# Patient Record
Sex: FEMALE | Race: WHITE | NOT HISPANIC OR LATINO | Employment: OTHER | URBAN - METROPOLITAN AREA
[De-identification: names, ages, dates, MRNs, and addresses within clinical notes are randomized per-mention and may not be internally consistent; named-entity substitution may affect disease eponyms.]

---

## 2023-05-19 ENCOUNTER — ANCILLARY PROCEDURE (OUTPATIENT)
Dept: GENERAL RADIOLOGY | Facility: CLINIC | Age: 73
End: 2023-05-19
Attending: FAMILY MEDICINE
Payer: COMMERCIAL

## 2023-05-19 ENCOUNTER — OFFICE VISIT (OUTPATIENT)
Dept: ORTHOPEDICS | Facility: CLINIC | Age: 73
End: 2023-05-19
Payer: COMMERCIAL

## 2023-05-19 VITALS — HEIGHT: 65 IN | BODY MASS INDEX: 27.82 KG/M2 | WEIGHT: 167 LBS

## 2023-05-19 DIAGNOSIS — M76.02 GLUTEAL TENDINITIS OF LEFT BUTTOCK: ICD-10-CM

## 2023-05-19 DIAGNOSIS — M25.561 CHRONIC PAIN OF RIGHT KNEE: ICD-10-CM

## 2023-05-19 DIAGNOSIS — G89.29 CHRONIC PAIN OF RIGHT KNEE: Primary | ICD-10-CM

## 2023-05-19 DIAGNOSIS — M25.561 CHRONIC PAIN OF RIGHT KNEE: Primary | ICD-10-CM

## 2023-05-19 DIAGNOSIS — M17.11 PRIMARY OSTEOARTHRITIS OF RIGHT KNEE: ICD-10-CM

## 2023-05-19 DIAGNOSIS — G89.29 CHRONIC PAIN OF RIGHT KNEE: ICD-10-CM

## 2023-05-19 PROCEDURE — 99204 OFFICE O/P NEW MOD 45 MIN: CPT | Performed by: FAMILY MEDICINE

## 2023-05-19 PROCEDURE — 73562 X-RAY EXAM OF KNEE 3: CPT | Mod: TC | Performed by: FAMILY MEDICINE

## 2023-05-19 NOTE — PATIENT INSTRUCTIONS
1. Chronic pain of right knee    2. Primary osteoarthritis of right knee    3. Gluteal tendinitis of left buttock      -Patient has chronic knee pain, instability and decreased endurance due to severe arthritis  -Patient will start formal physical therapy and home exercise program to strengthen and stabilize her knee.  -Patient also has progressive left hip pain due to muscle and tendon strains from compensation of the right leg  -Patient may continue with over-the-counter pain medications as needed  -Patient will follow up if pain does not improve.  To consider a cortisone injection of the right knee.  Potential cortisone of the left hip as well if indicated.  -If patient has persistent left hip pain, she will follow-up in the clinic for x-rays and further evaluation.  -Call direct clinic number [[phone]] at any time with questions or concerns.    Albert Yeo MD [facility]  [facility] [place]
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
## 2023-05-21 ENCOUNTER — HEALTH MAINTENANCE LETTER (OUTPATIENT)
Age: 73
End: 2023-05-21

## 2023-06-01 ASSESSMENT — ACTIVITIES OF DAILY LIVING (ADL)
KNEEL ON THE FRONT OF YOUR KNEE: ACTIVITY IS VERY DIFFICULT
RECREATIONAL_ACTIVITIES: SLIGHT DIFFICULTY
WALKING_UP_STEEP_HILLS: NO DIFFICULTY AT ALL
GETTING INTO AND OUT OF AN AVERAGE CAR: SLIGHT DIFFICULTY
SWELLING: THE SYMPTOM AFFECTS MY ACTIVITY SLIGHTLY
STEPPING UP AND DOWN CURBS: NO DIFFICULTY AT ALL
ROLLING_OVER_IN_BED: NO DIFFICULTY AT ALL
HOW_WOULD_YOU_RATE_YOUR_CURRENT_LEVEL_OF_FUNCTION_DURING_YOUR_USUAL_ACTIVITIES_OF_DAILY_LIVING_FROM_0_TO_100_WITH_100_BEING_YOUR_LEVEL_OF_FUNCTION_PRIOR_TO_YOUR_HIP_PROBLEM_AND_0_BEING_THE_INABILITY_TO_PERFORM_ANY_OF_YOUR_USUAL_DAILY_ACTIVITIES?: 80
HOW_WOULD_YOU_RATE_YOUR_CURRENT_LEVEL_OF_FUNCTION?: ABNORMAL
GO DOWN STAIRS: ACTIVITY IS MINIMALLY DIFFICULT
WALK: ACTIVITY IS SOMEWHAT DIFFICULT
WALKING_INITIALLY: MODERATE DIFFICULTY
GOING_UP_1_FLIGHT_OF_STAIRS: NO DIFFICULTY AT ALL
WALKING_INITIALLY: MODERATE DIFFICULTY
RECREATIONAL ACTIVITIES: SLIGHT DIFFICULTY
LANDING: SLIGHT DIFFICULTY
DEEP SQUATTING: EXTREME DIFFICULTY
SPORTS_COUNT: 9
DEEP_SQUATTING: EXTREME DIFFICULTY
HOS_ADL_SCORE(%): 82.35
GO UP STAIRS: ACTIVITY IS MINIMALLY DIFFICULT
PUTTING ON SOCKS AND SHOES: NO DIFFICULTY AT ALL
LOW_IMPACT_ACTIVITIES_LIKE_FAST_WALKING: MODERATE DIFFICULTY
GETTING_INTO_AND_OUT_OF_A_BATHTUB: MODERATE DIFFICULTY
STIFFNESS: THE SYMPTOM AFFECTS MY ACTIVITY MODERATELY
PUTTING_ON_SOCKS_AND_SHOES: NO DIFFICULTY AT ALL
ADL_COUNT: 17
LIMPING: THE SYMPTOM AFFECTS MY ACTIVITY MODERATELY
SPORTS_HIGHEST_POTENTIAL_SCORE: 36
ADL_HIGHEST_POTENTIAL_SCORE: 68
PAIN: THE SYMPTOM AFFECTS MY ACTIVITY MODERATELY
SPORTS_SCORE(%): 0
WALKING_FOR_APPROXIMATELY_10_MINUTES: NO DIFFICULTY AT ALL
STAND: ACTIVITY IS NOT DIFFICULT
KNEE_ACTIVITY_OF_DAILY_LIVING_SCORE: 57.14
WALKING_DOWN_STEEP_HILLS: SLIGHT DIFFICULTY
WALKING_UP_STEEP_HILLS: NO DIFFICULTY AT ALL
SIT WITH YOUR KNEE BENT: ACTIVITY IS MINIMALLY DIFFICULT
AS_A_RESULT_OF_YOUR_KNEE_INJURY,_HOW_WOULD_YOU_RATE_YOUR_CURRENT_LEVEL_OF_DAILY_ACTIVITY?: ABNORMAL
GETTING_INTO_AND_OUT_OF_A_BATHTUB: MODERATE DIFFICULTY
GIVING WAY, BUCKLING OR SHIFTING OF KNEE: THE SYMPTOM AFFECTS MY ACTIVITY SLIGHTLY
WEAKNESS: THE SYMPTOM AFFECTS MY ACTIVITY MODERATELY
RAW_SCORE: 40
STANDING_FOR_15_MINUTES: NO DIFFICULTY AT ALL
SPORTS_TOTAL_ITEM_SCORE: 0
HOW_WOULD_YOU_RATE_THE_OVERALL_FUNCTION_OF_YOUR_KNEE_DURING_YOUR_USUAL_DAILY_ACTIVITIES?: ABNORMAL
STAND: ACTIVITY IS NOT DIFFICULT
LIMPING: THE SYMPTOM AFFECTS MY ACTIVITY MODERATELY
RISE FROM A CHAIR: ACTIVITY IS SOMEWHAT DIFFICULT
TWISTING/PIVOTING ON INVOLVED LEG: NO DIFFICULTY AT ALL
WEAKNESS: THE SYMPTOM AFFECTS MY ACTIVITY MODERATELY
WALK: ACTIVITY IS SOMEWHAT DIFFICULT
SWELLING: THE SYMPTOM AFFECTS MY ACTIVITY SLIGHTLY
PLEASE_INDICATE_YOR_PRIMARY_REASON_FOR_REFERRAL_TO_THERAPY:: KNEE
ADL_TOTAL_ITEM_SCORE: 0
GOING DOWN 1 FLIGHT OF STAIRS: NO DIFFICULTY AT ALL
GOING UP 1 FLIGHT OF STAIRS: NO DIFFICULTY AT ALL
HOW_WOULD_YOU_RATE_THE_OVERALL_FUNCTION_OF_YOUR_KNEE_DURING_YOUR_USUAL_DAILY_ACTIVITIES?: ABNORMAL
RUNNING_ONE_MILE: EXTREME DIFFICULTY
SITTING FOR 15 MINUTES: NO DIFFICULTY AT ALL
SWINGING_OBJECTS_LIKE_A_GOLF_CLUB: NO DIFFICULTY AT ALL
TWISTING/PIVOTING_ON_INVOLVED_LEG: NO DIFFICULTY AT ALL
ABILITY_TO_PERFORM_ACTIVITY_WITH_YOUR_NORMAL_TECHNIQUE: MODERATE DIFFICULTY
WALKING_15_MINUTES_OR_GREATER: MODERATE DIFFICULTY
HOW_WOULD_YOU_RATE_YOUR_CURRENT_LEVEL_OF_FUNCTION_DURING_YOUR_USUAL_ACTIVITIES_OF_DAILY_LIVING_FROM_0_TO_100_WITH_100_BEING_YOUR_LEVEL_OF_FUNCTION_PRIOR_TO_YOUR_HIP_PROBLEM_AND_0_BEING_THE_INABILITY_TO_PERFORM_ANY_OF_YOUR_USUAL_DAILY_ACTIVITIES?: 80
LIGHT_TO_MODERATE_WORK: NO DIFFICULTY AT ALL
LIGHT_TO_MODERATE_WORK: NO DIFFICULTY AT ALL
WALKING_APPROXIMATELY_10_MINUTES: NO DIFFICULTY AT ALL
GO UP STAIRS: ACTIVITY IS MINIMALLY DIFFICULT
SQUAT: ACTIVITY IS FAIRLY DIFFICULT
ADL_SCORE(%): 0
AS_A_RESULT_OF_YOUR_KNEE_INJURY,_HOW_WOULD_YOU_RATE_YOUR_CURRENT_LEVEL_OF_DAILY_ACTIVITY?: ABNORMAL
GETTING_INTO_AND_OUT_OF_AN_AVERAGE_CAR: SLIGHT DIFFICULTY
GIVING WAY, BUCKLING OR SHIFTING OF KNEE: THE SYMPTOM AFFECTS MY ACTIVITY SLIGHTLY
PAIN: THE SYMPTOM AFFECTS MY ACTIVITY MODERATELY
HOW_WOULD_YOU_RATE_THE_CURRENT_FUNCTION_OF_YOUR_KNEE_DURING_YOUR_USUAL_DAILY_ACTIVITIES_ON_A_SCALE_FROM_0_TO_100_WITH_100_BEING_YOUR_LEVEL_OF_KNEE_FUNCTION_PRIOR_TO_YOUR_INJURY_AND_0_BEING_THE_INABILITY_TO_PERFORM_ANY_OF_YOUR_USUAL_DAILY_ACTIVITIES?: 65
STIFFNESS: THE SYMPTOM AFFECTS MY ACTIVITY MODERATELY
KNEEL ON THE FRONT OF YOUR KNEE: ACTIVITY IS VERY DIFFICULT
HOW_WOULD_YOU_RATE_YOUR_CURRENT_LEVEL_OF_FUNCTION_DURING_YOUR_SPORTS_RELATED_ACTIVITIES_FROM_0_TO_100_WITH_100_BEING_YOUR_LEVEL_OF_FUNCTION_PRIOR_TO_YOUR_HIP_PROBLEM_AND_0_BEING_THE_INABILITY_TO_PERFORM_ANY_OF_YOUR_USUAL_DAILY_ACTIVITIES?: 65
SITTING_FOR_15_MINUTES: NO DIFFICULTY AT ALL
HOW_WOULD_YOU_RATE_THE_CURRENT_FUNCTION_OF_YOUR_KNEE_DURING_YOUR_USUAL_DAILY_ACTIVITIES_ON_A_SCALE_FROM_0_TO_100_WITH_100_BEING_YOUR_LEVEL_OF_KNEE_FUNCTION_PRIOR_TO_YOUR_INJURY_AND_0_BEING_THE_INABILITY_TO_PERFORM_ANY_OF_YOUR_USUAL_DAILY_ACTIVITIES?: 65
GOING_DOWN_1_FLIGHT_OF_STAIRS: NO DIFFICULTY AT ALL
WALKING_DOWN_STEEP_HILLS: SLIGHT DIFFICULTY
HOS_ADL_HIGHEST_POTENTIAL_SCORE: 68
CUTTING/LATERAL_MOVEMENTS: SLIGHT DIFFICULTY
HEAVY_WORK: NO DIFFICULTY AT ALL
PLEASE_INDICATE_YOR_PRIMARY_REASON_FOR_REFERRAL_TO_THERAPY:: HIP
JUMPING: MODERATE DIFFICULTY
STEPPING_UP_AND_DOWN_CURBS: NO DIFFICULTY AT ALL
ROLLING OVER IN BED: NO DIFFICULTY AT ALL
WALKING_15_MINUTES_OR_GREATER: MODERATE DIFFICULTY
HOS_ADL_ITEM_SCORE_TOTAL: 56
STARTING_AND_STOPPING_QUICKLY: SLIGHT DIFFICULTY
ABILITY_TO_PARTICIPATE_IN_YOUR_DESIRED_SPORT_AS_LONG_AS_YOU_WOULD_LIKE: MODERATE DIFFICULTY
KNEE_ACTIVITY_OF_DAILY_LIVING_SUM: 40
RISE FROM A CHAIR: ACTIVITY IS SOMEWHAT DIFFICULT
GO DOWN STAIRS: ACTIVITY IS MINIMALLY DIFFICULT
SQUAT: ACTIVITY IS FAIRLY DIFFICULT
SIT WITH YOUR KNEE BENT: ACTIVITY IS MINIMALLY DIFFICULT
STANDING FOR 15 MINUTES: NO DIFFICULTY AT ALL
HEAVY_WORK: NO DIFFICULTY AT ALL

## 2023-06-06 ENCOUNTER — THERAPY VISIT (OUTPATIENT)
Dept: PHYSICAL THERAPY | Facility: CLINIC | Age: 73
End: 2023-06-06
Attending: FAMILY MEDICINE
Payer: COMMERCIAL

## 2023-06-06 DIAGNOSIS — M25.561 CHRONIC PAIN OF RIGHT KNEE: ICD-10-CM

## 2023-06-06 DIAGNOSIS — G89.29 CHRONIC PAIN OF RIGHT KNEE: ICD-10-CM

## 2023-06-06 DIAGNOSIS — M17.11 PRIMARY OSTEOARTHRITIS OF RIGHT KNEE: ICD-10-CM

## 2023-06-06 PROCEDURE — 97161 PT EVAL LOW COMPLEX 20 MIN: CPT | Mod: GP | Performed by: PHYSICAL THERAPIST

## 2023-06-06 PROCEDURE — 97110 THERAPEUTIC EXERCISES: CPT | Mod: GP | Performed by: PHYSICAL THERAPIST
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
## 2023-06-13 ENCOUNTER — THERAPY VISIT (OUTPATIENT)
Dept: PHYSICAL THERAPY | Facility: CLINIC | Age: 73
End: 2023-06-13
Attending: FAMILY MEDICINE
Payer: COMMERCIAL

## 2023-06-13 DIAGNOSIS — M25.561 CHRONIC PAIN OF RIGHT KNEE: Primary | ICD-10-CM

## 2023-06-13 DIAGNOSIS — G89.29 CHRONIC PAIN OF RIGHT KNEE: Primary | ICD-10-CM

## 2023-06-13 PROCEDURE — 97112 NEUROMUSCULAR REEDUCATION: CPT | Mod: GP | Performed by: PHYSICAL THERAPIST

## 2023-06-13 PROCEDURE — 97110 THERAPEUTIC EXERCISES: CPT | Mod: GP | Performed by: PHYSICAL THERAPIST

## 2023-06-20 ENCOUNTER — THERAPY VISIT (OUTPATIENT)
Dept: PHYSICAL THERAPY | Facility: CLINIC | Age: 73
End: 2023-06-20
Attending: FAMILY MEDICINE
Payer: COMMERCIAL

## 2023-06-20 ENCOUNTER — MYC MEDICAL ADVICE (OUTPATIENT)
Dept: ORTHOPEDICS | Facility: CLINIC | Age: 73
End: 2023-06-20

## 2023-06-20 DIAGNOSIS — M17.11 PRIMARY OSTEOARTHRITIS OF RIGHT KNEE: Primary | ICD-10-CM

## 2023-06-20 DIAGNOSIS — G89.29 CHRONIC PAIN OF RIGHT KNEE: Primary | ICD-10-CM

## 2023-06-20 DIAGNOSIS — M25.561 CHRONIC PAIN OF RIGHT KNEE: Primary | ICD-10-CM

## 2023-06-20 PROCEDURE — 97110 THERAPEUTIC EXERCISES: CPT | Mod: GP | Performed by: PHYSICAL THERAPIST

## 2023-06-20 PROCEDURE — 97530 THERAPEUTIC ACTIVITIES: CPT | Mod: GP | Performed by: PHYSICAL THERAPIST

## 2023-06-20 NOTE — TELEPHONE ENCOUNTER
Patient was last seen on 5/19/23. Plan per LOV:  -Patient will start formal physical therapy and home exercise program to strengthen and stabilize her knee.  -Patient also has progressive left hip pain due to muscle and tendon strains from compensation of the right leg  -Patient may continue with over-the-counter pain medications as needed  -Patient will follow up if pain does not improve.  To consider a cortisone injection of the right knee.  Potential cortisone of the left hip as well if indicated.  -If patient has persistent left hip pain, she will follow-up in the clinic for x-rays and further evaluation.      Please see patient's mychart message regarding questions about continued knee pain and advise. Ortho referral pending if appropriate.    Julia Javed MBA, ATC

## 2023-08-02 ENCOUNTER — OFFICE VISIT (OUTPATIENT)
Dept: ORTHOPEDICS | Facility: CLINIC | Age: 73
End: 2023-08-02
Attending: FAMILY MEDICINE
Payer: COMMERCIAL

## 2023-08-02 VITALS — SYSTOLIC BLOOD PRESSURE: 135 MMHG | BODY MASS INDEX: 28.12 KG/M2 | WEIGHT: 169 LBS | DIASTOLIC BLOOD PRESSURE: 82 MMHG

## 2023-08-02 DIAGNOSIS — M17.11 PRIMARY OSTEOARTHRITIS OF RIGHT KNEE: ICD-10-CM

## 2023-08-02 PROCEDURE — 99204 OFFICE O/P NEW MOD 45 MIN: CPT | Performed by: STUDENT IN AN ORGANIZED HEALTH CARE EDUCATION/TRAINING PROGRAM

## 2023-08-02 RX ORDER — TRANEXAMIC ACID 650 MG/1
1950 TABLET ORAL ONCE
Status: CANCELLED | OUTPATIENT
Start: 2023-08-02 | End: 2023-08-02

## 2023-08-02 NOTE — PATIENT INSTRUCTIONS
1. Primary osteoarthritis of right knee        Schedule total knee surgery. Same day surgery   Surgery Scheduler will contact you to assist with scheduling surgery.   You can contact her directly at [phone].   Prior to your scheduled surgery we advise scheduling with your dentist to obtain clearance for surgery, and to complete any recommended dental work prior.     Pre-operative Physical needed within 30 days of scheduled proceedure  Physical Therapy will be scheduled.    For mor information regarding total joint surgery please visit our website:   [email or web address]    FORMS:   If you are needing any forms completed relating to your upcoming procedure, please send them to our office with a completed Release of Information.   Forms will be completed AFTER your procedure. A letter can be sent to your employer prior to surgery to inform them of your anticipated time off.    Please notify our staff if you would like a letter to do so.   Forms can be faxed directly to our clinic at [phone].     DO NOT BRING FORMS ON THE DATE OF SURGERY.     MEDICATION REFILL:   Please allow 3 business days for completion of medication refills for any surgery related prescription.   Medication refills submitted on Friday, may not be addressed until the following Monday.  You may request a refill via [email or web address], or by calling our Nurse Triage at [phone].    Follow up with Dr. Stauffer in at surgery     Call my office with any questions or concerns, [phone].
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
## 2023-08-03 ENCOUNTER — TELEPHONE (OUTPATIENT)
Dept: ORTHOPEDICS | Facility: CLINIC | Age: 73
End: 2023-08-03
Payer: COMMERCIAL

## 2023-08-03 DIAGNOSIS — M17.11 PRIMARY OSTEOARTHRITIS OF RIGHT KNEE: Primary | ICD-10-CM

## 2023-08-03 NOTE — TELEPHONE ENCOUNTER
Patient needs leg length XR per provider prior to surgery. Orders have been placed and patient will be called to schedule a XR only appointment.    Dioni Robles ATC

## 2023-08-04 ENCOUNTER — ANCILLARY PROCEDURE (OUTPATIENT)
Dept: GENERAL RADIOLOGY | Facility: CLINIC | Age: 73
End: 2023-08-04
Attending: STUDENT IN AN ORGANIZED HEALTH CARE EDUCATION/TRAINING PROGRAM
Payer: COMMERCIAL

## 2023-08-04 DIAGNOSIS — M17.11 PRIMARY OSTEOARTHRITIS OF RIGHT KNEE: ICD-10-CM

## 2023-08-04 PROCEDURE — 77073 BONE LENGTH STUDIES: CPT | Mod: TC | Performed by: RADIOLOGY

## 2023-08-08 ENCOUNTER — TELEPHONE (OUTPATIENT)
Dept: ORTHOPEDICS | Facility: CLINIC | Age: 73
End: 2023-08-08
Payer: COMMERCIAL

## 2023-08-08 NOTE — TELEPHONE ENCOUNTER
Phoned patient to schedule surgery with Dr Stauffer. I left her my direct number to call back when she is able. [phone]

## 2023-08-08 NOTE — TELEPHONE ENCOUNTER
Patient is scheduled for surgery with Dr. Stauffer    Spoke with: Deepali    Date of Surgery: 11/14/23    Location: [facility] OR    Informed patient they will need an adult  Yes    H&P: Scheduled with PCP, patient will schedule    Additional imaging/appointments: POP Made    Surgery packet: Mailed     Additional comments: N/A        Nunu Cole on 8/8/2023 at 3:01 PM

## 2023-09-26 ENCOUNTER — TELEPHONE (OUTPATIENT)
Dept: ORTHOPEDICS | Facility: CLINIC | Age: 73
End: 2023-09-26
Payer: COMMERCIAL

## 2023-09-26 DIAGNOSIS — M17.11 PRIMARY OSTEOARTHRITIS OF RIGHT KNEE: Primary | ICD-10-CM

## 2023-09-26 NOTE — TELEPHONE ENCOUNTER
Attempted to phone patient to review pre-op teaching for upcoming right total knee arthroplasty DOS 11/14/23 with Dr. Stauffer.    No answer during call, VM was left with call back information.    Post op PT order entered.    Angelita Razo RN on 9/26/2023 at 12:10 PM

## 2023-10-20 RX ORDER — IBUPROFEN 400 MG/1
400 TABLET, FILM COATED ORAL EVERY 6 HOURS PRN
Status: ON HOLD | COMMUNITY
End: 2023-11-15

## 2023-10-20 NOTE — PROVIDER NOTIFICATION
10/20/23 1527   Discharge Planning   Patient/Family Anticipates Transition to home with family   Concerns to be Addressed all concerns addressed in this encounter   Living Arrangements   People in Home spouse   Type of Residence Private Residence   Is your private residence a single family home or apartment? Single family home   Number of Stairs, Within Home, Primary two   Stair Railings, Within Home, Primary railings safe and in good condition   Once home, are you able to live on one level? Yes   Which level? Main Level   Bathroom Shower/Tub Walk-in shower   Equipment Currently Used at Home none;grab bar, tub/shower;grab bar, toilet   Support System   Support Systems Spouse/Significant Other   Do you have someone available to stay with you one or two nights once you are home? Yes   Medical Clearance   Date of Physical 10/27/23   Clinic Name [facility]   It is recommended that you call and check with any specialty providers before surgery to see if you need surgical clearance.  Do you see any specialty providers outside of your primary care provider? No   Blood   Known Bleeding Disorder or Coagulopathy No   Does the patient have any [place]/cultural preferences related to blood products? No   Education   What day did the patient complete, or plan to complete, pre-op total joint education? 10/19/23   Patient attended total joint pre-op class/received pre-op teaching  online

## 2023-10-27 ENCOUNTER — OFFICE VISIT (OUTPATIENT)
Dept: FAMILY MEDICINE | Facility: CLINIC | Age: 73
End: 2023-10-27
Payer: COMMERCIAL

## 2023-10-27 VITALS
SYSTOLIC BLOOD PRESSURE: 130 MMHG | TEMPERATURE: 98.6 F | OXYGEN SATURATION: 96 % | DIASTOLIC BLOOD PRESSURE: 80 MMHG | BODY MASS INDEX: 29.16 KG/M2 | WEIGHT: 170.8 LBS | RESPIRATION RATE: 16 BRPM | HEIGHT: 64 IN | HEART RATE: 68 BPM

## 2023-10-27 DIAGNOSIS — M17.11 PRIMARY OSTEOARTHRITIS OF RIGHT KNEE: ICD-10-CM

## 2023-10-27 DIAGNOSIS — Z01.818 PREOP GENERAL PHYSICAL EXAM: Primary | ICD-10-CM

## 2023-10-27 LAB
ANION GAP SERPL CALCULATED.3IONS-SCNC: 10 MMOL/L (ref 7–15)
BUN SERPL-MCNC: 21.7 MG/DL (ref 8–23)
CALCIUM SERPL-MCNC: 9.8 MG/DL (ref 8.8–10.2)
CHLORIDE SERPL-SCNC: 103 MMOL/L (ref 98–107)
CREAT SERPL-MCNC: 0.69 MG/DL (ref 0.51–0.95)
DEPRECATED HCO3 PLAS-SCNC: 26 MMOL/L (ref 22–29)
EGFRCR SERPLBLD CKD-EPI 2021: >90 ML/MIN/1.73M2
ERYTHROCYTE [DISTWIDTH] IN BLOOD BY AUTOMATED COUNT: 12.6 % (ref 10–15)
GLUCOSE SERPL-MCNC: 95 MG/DL (ref 70–99)
HCT VFR BLD AUTO: 45.3 % (ref 35–47)
HGB BLD-MCNC: 14.4 G/DL (ref 11.7–15.7)
MCH RBC QN AUTO: 28.8 PG (ref 26.5–33)
MCHC RBC AUTO-ENTMCNC: 31.8 G/DL (ref 31.5–36.5)
MCV RBC AUTO: 91 FL (ref 78–100)
PLATELET # BLD AUTO: 341 10E3/UL (ref 150–450)
POTASSIUM SERPL-SCNC: 4.2 MMOL/L (ref 3.4–5.3)
RBC # BLD AUTO: 5 10E6/UL (ref 3.8–5.2)
SODIUM SERPL-SCNC: 139 MMOL/L (ref 135–145)
WBC # BLD AUTO: 8.1 10E3/UL (ref 4–11)

## 2023-10-27 PROCEDURE — 80048 BASIC METABOLIC PNL TOTAL CA: CPT | Performed by: PHYSICIAN ASSISTANT

## 2023-10-27 PROCEDURE — 36415 COLL VENOUS BLD VENIPUNCTURE: CPT | Performed by: PHYSICIAN ASSISTANT

## 2023-10-27 PROCEDURE — 99204 OFFICE O/P NEW MOD 45 MIN: CPT | Performed by: PHYSICIAN ASSISTANT

## 2023-10-27 PROCEDURE — 85027 COMPLETE CBC AUTOMATED: CPT | Performed by: PHYSICIAN ASSISTANT

## 2023-10-27 PROCEDURE — 93000 ELECTROCARDIOGRAM COMPLETE: CPT | Performed by: PHYSICIAN ASSISTANT

## 2023-10-27 RX ORDER — RESPIRATORY SYNCYTIAL VIRUS VACCINE 120MCG/0.5
0.5 KIT INTRAMUSCULAR ONCE
Qty: 1 EACH | Refills: 0 | Status: CANCELLED | OUTPATIENT
Start: 2023-10-27 | End: 2023-10-27

## 2023-10-27 ASSESSMENT — PAIN SCALES - GENERAL: PAINLEVEL: NO PAIN (0)

## 2023-10-27 NOTE — PATIENT INSTRUCTIONS
Preparing for Your Surgery  Getting started  A nurse will call you to review your health history and instructions. They will give you an arrival time based on your scheduled surgery time. Please be ready to share:  Your doctor's clinic name and phone number  Your medical, surgical, and anesthesia history  A list of allergies and sensitivities  A list of medicines, including herbal treatments and over-the-counter drugs  Whether the patient has a legal guardian (ask how to send us the papers in advance)  Please tell us if you're pregnant--or if there's any chance you might be pregnant. Some surgeries may injure a fetus (unborn baby), so they require a pregnancy test. Surgeries that are safe for a fetus don't always need a test, and you can choose whether to have one.   If you have a child who's having surgery, please ask for a copy of Preparing for Your Child's Surgery.    Preparing for surgery  Within 10 to 30 days of surgery: Have a pre-op exam (sometimes called an H&P, or History and Physical). This can be done at a clinic or pre-operative center.  If you're having a , you may not need this exam. Talk to your care team.  At your pre-op exam, talk to your care team about all medicines you take. If you need to stop any medicines before surgery, ask when to start taking them again.  We do this for your safety. Many medicines can make you bleed too much during surgery. Some change how well surgery (anesthesia) drugs work.  Call your insurance company to let them know you're having surgery. (If you don't have insurance, call [phone].)  Call your clinic if there's any change in your health. This includes signs of a cold or flu (sore throat, runny nose, cough, rash, fever). It also includes a scrape or scratch near the surgery site.  If you have questions on the day of surgery, call your hospital or surgery center.  Eating and drinking guidelines  For your safety: Unless your surgeon tells you otherwise,  follow the guidelines below.  Eat and drink as usual until 8 hours before you arrive for surgery. After that, no food or milk.  Drink clear liquids until 2 hours before you arrive. These are liquids you can see through, like water, Gatorade, and Propel Water. They also include plain black coffee and tea (no cream or milk), candy, and breath mints. You can spit out gum when you arrive.  If you drink alcohol: Stop drinking it the night before surgery.  If your care team tells you to take medicine on the morning of surgery, it's okay to take it with a sip of water.  Preventing infection  Shower or bathe the night before and morning of your surgery. Follow the instructions your clinic gave you. (If no instructions, use regular soap.)  Don't shave or clip hair near your surgery site. We'll remove the hair if needed.  Don't smoke or vape the morning of surgery. You may chew nicotine gum up to 2 hours before surgery. A nicotine patch is okay.  Note: Some surgeries require you to completely quit smoking and nicotine. Check with your surgeon.  Your care team will make every effort to keep you safe from infection. We will:  Clean our hands often with soap and water (or an alcohol-based hand rub).  Clean the skin at your surgery site with a special soap that kills germs.  Give you a special gown to keep you warm. (Cold raises the risk of infection.)  Wear special hair covers, masks, gowns and gloves during surgery.  Give antibiotic medicine, if prescribed. Not all surgeries need antibiotics.  What to bring on the day of surgery  Photo ID and insurance card  Copy of your health care directive, if you have one  Glasses and hearing aids (bring cases)  You can't wear contacts during surgery  Inhaler and eye drops, if you use them (tell us about these when you arrive)  CPAP machine or breathing device, if you use them  A few personal items, if spending the night  If you have . . .  A pacemaker, ICD (cardiac defibrillator) or other  implant: Bring the ID card.  An implanted stimulator: Bring the remote control.  A legal guardian: Bring a copy of the certified (court-stamped) guardianship papers.  Please remove any jewelry, including body piercings. Leave jewelry and other valuables at home.  If you're going home the day of surgery  You must have a responsible adult drive you home. They should stay with you overnight as well.  If you don't have someone to stay with you, and you aren't safe to go home alone, we may keep you overnight. Insurance often won't pay for this.  After surgery  If it's hard to control your pain or you need more pain medicine, please call your surgeon's office.  Questions?   If you have any questions for your care team, list them here: _________________________________________________________________________________________________________________________________________________________________________ ____________________________________ ____________________________________ ____________________________________  For informational purposes only. Not to replace the advice of your health care provider. Copyright   2003, 2019 [employer]. All rights reserved. Clinically reviewed by Mariaelena Rosado MD. [employer] 014143 - REV 12/22.    How to Take Your Medication Before Surgery  - HOLD (do not take) NSAIDS (ibuprofen, advil, aleve, naproxen) 1 week prior to surgery. Tylenol is OK to take   - STOP taking all vitamins and herbal supplements 14 days before surgery.
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
## 2023-10-27 NOTE — RESULT ENCOUNTER NOTE
Deepali,    I have reviewed your lab results below:    - blood counts are normal - normal hemoglobin/red blood cells (no anemia), normal white blood cells (infection fighting cells), normal platelets (affect ability to clot normally)    - electrolytes, blood sugar and kidney function are normal     Please let me know if you have any further questions.    Take care,  Nona Johnson PA-C   10/27/2023   2:37 PM

## 2023-11-06 NOTE — PROGRESS NOTES
Ortho Navigator Note      Pre-op Date 10/27/23     Medical Clearance  Cleared     Labs Stable      COVID Test Date No longer indicated      Skin  Intact      Activity: Ambulates independently without assistive devic e     Equipment Need Patient will likely need a walker for discharge. Defer to PT/OT for recs.       Meds to Hold Held all supplements 14 days prior to surgery  Ibuprofen- Hold 48 hr prior to surgery     * Medication recommendations are not intended to be exhaustive; they are limited to common medications that are potentially dangerous if incorrectly managed   NPO Instructions  Instructed to stop solids 8 hr prior to arrival and clears 2 hr prior to arrival.       Pre-op Joint Education Complete? Complete   Discharge Plan Patient has plan to discharge home on morning of POD 1.    Junior will arrive at hospital at 0800 to participate in therapy and discharge education. They will then transport patient home    /Transportation Junior will be  and transportation.  is physically able to care for patient.      Barriers to Discharge No barriers to discharge.      Additional Info/   Special Needs : Patient had no unanswered questions or concerns.           10/20/23 6917   Discharge Planning   Patient/Family Anticipates Transition to home with family   Concerns to be Addressed all concerns addressed in this encounter   Living Arrangements   People in Home spouse   Type of Residence Private Residence   Is your private residence a single family home or apartment? Single family home   Number of Stairs, Within Home, Primary two   Stair Railings, Within Home, Primary railings safe and in good condition   Once home, are you able to live on one level? Yes   Which level? Main Level   Bathroom Shower/Tub Walk-in shower   Equipment Currently Used at Home none;grab bar, tub/shower;grab bar, toilet   Support System   Support Systems Spouse/Significant Other   Do you have someone available to stay with you  one or two nights once you are home? Yes   Medical Clearance   Date of Physical 10/27/23   Clinic Name [facility]   It is recommended that you call and check with any specialty providers before surgery to see if you need surgical clearance.  Do you see any specialty providers outside of your primary care provider? No   Blood   Known Bleeding Disorder or Coagulopathy No   Does the patient have any Yazdanism/cultural preferences related to blood products? No   Education   Has the patient scheduled or completed pre-op total joint education, either in class or online, in the last 12 months? Yes   What day did the patient complete, or plan to complete, pre-op total joint education? 10/19/23   Patient attended total joint pre-op class/received pre-op teaching  online

## 2023-11-13 NOTE — PHARMACY-ADMISSION MEDICATION HISTORY
Medication reconciliation interview completed by pre-admitting nurse, reviewed by pharmacy.   No further clarifications needed.     Prior to Admission medications    Medication Sig Last Dose Taking? Auth Provider Long Term End Date   ibuprofen (ADVIL/MOTRIN) 400 MG tablet Take 400 mg by mouth every 6 hours as needed for moderate pain  Yes Reported, Patient

## 2023-11-14 ENCOUNTER — APPOINTMENT (OUTPATIENT)
Dept: GENERAL RADIOLOGY | Facility: CLINIC | Age: 73
End: 2023-11-14
Attending: STUDENT IN AN ORGANIZED HEALTH CARE EDUCATION/TRAINING PROGRAM
Payer: COMMERCIAL

## 2023-11-14 ENCOUNTER — HOSPITAL ENCOUNTER (OUTPATIENT)
Facility: CLINIC | Age: 73
Discharge: HOME | End: 2023-11-15
Attending: STUDENT IN AN ORGANIZED HEALTH CARE EDUCATION/TRAINING PROGRAM | Admitting: STUDENT IN AN ORGANIZED HEALTH CARE EDUCATION/TRAINING PROGRAM
Payer: COMMERCIAL

## 2023-11-14 ENCOUNTER — ANESTHESIA (OUTPATIENT)
Dept: SURGERY | Facility: CLINIC | Age: 73
End: 2023-11-14
Payer: COMMERCIAL

## 2023-11-14 ENCOUNTER — ANESTHESIA EVENT (OUTPATIENT)
Dept: SURGERY | Facility: CLINIC | Age: 73
End: 2023-11-14
Payer: COMMERCIAL

## 2023-11-14 ENCOUNTER — APPOINTMENT (OUTPATIENT)
Dept: PHYSICAL THERAPY | Facility: CLINIC | Age: 73
End: 2023-11-14
Attending: STUDENT IN AN ORGANIZED HEALTH CARE EDUCATION/TRAINING PROGRAM
Payer: COMMERCIAL

## 2023-11-14 DIAGNOSIS — M17.11 PRIMARY OSTEOARTHRITIS OF RIGHT KNEE: Primary | ICD-10-CM

## 2023-11-14 PROBLEM — Z96.651 S/P TOTAL KNEE ARTHROPLASTY, RIGHT: Status: ACTIVE | Noted: 2023-11-14

## 2023-11-14 PROCEDURE — 250N000011 HC RX IP 250 OP 636: Performed by: NURSE ANESTHETIST, CERTIFIED REGISTERED

## 2023-11-14 PROCEDURE — 97530 THERAPEUTIC ACTIVITIES: CPT | Mod: GP

## 2023-11-14 PROCEDURE — 258N000003 HC RX IP 258 OP 636: Performed by: ANESTHESIOLOGY

## 2023-11-14 PROCEDURE — 999N000065 XR KNEE PORT RIGHT 1/2 VIEWS: Mod: RT

## 2023-11-14 PROCEDURE — 250N000025 HC SEVOFLURANE, PER MIN: Performed by: STUDENT IN AN ORGANIZED HEALTH CARE EDUCATION/TRAINING PROGRAM

## 2023-11-14 PROCEDURE — 258N000003 HC RX IP 258 OP 636: Performed by: NURSE ANESTHETIST, CERTIFIED REGISTERED

## 2023-11-14 PROCEDURE — 250N000011 HC RX IP 250 OP 636: Performed by: STUDENT IN AN ORGANIZED HEALTH CARE EDUCATION/TRAINING PROGRAM

## 2023-11-14 PROCEDURE — 250N000011 HC RX IP 250 OP 636: Mod: JZ | Performed by: STUDENT IN AN ORGANIZED HEALTH CARE EDUCATION/TRAINING PROGRAM

## 2023-11-14 PROCEDURE — C1776 JOINT DEVICE (IMPLANTABLE): HCPCS | Performed by: STUDENT IN AN ORGANIZED HEALTH CARE EDUCATION/TRAINING PROGRAM

## 2023-11-14 PROCEDURE — 272N000001 HC OR GENERAL SUPPLY STERILE: Performed by: STUDENT IN AN ORGANIZED HEALTH CARE EDUCATION/TRAINING PROGRAM

## 2023-11-14 PROCEDURE — 250N000011 HC RX IP 250 OP 636: Mod: JZ | Performed by: ANESTHESIOLOGY

## 2023-11-14 PROCEDURE — 710N000009 HC RECOVERY PHASE 1, LEVEL 1, PER MIN: Performed by: STUDENT IN AN ORGANIZED HEALTH CARE EDUCATION/TRAINING PROGRAM

## 2023-11-14 PROCEDURE — 258N000003 HC RX IP 258 OP 636: Performed by: STUDENT IN AN ORGANIZED HEALTH CARE EDUCATION/TRAINING PROGRAM

## 2023-11-14 PROCEDURE — 97116 GAIT TRAINING THERAPY: CPT | Mod: GP

## 2023-11-14 PROCEDURE — C1713 ANCHOR/SCREW BN/BN,TIS/BN: HCPCS | Performed by: STUDENT IN AN ORGANIZED HEALTH CARE EDUCATION/TRAINING PROGRAM

## 2023-11-14 PROCEDURE — 250N000009 HC RX 250: Performed by: NURSE ANESTHETIST, CERTIFIED REGISTERED

## 2023-11-14 PROCEDURE — 27447 TOTAL KNEE ARTHROPLASTY: CPT | Mod: RT | Performed by: STUDENT IN AN ORGANIZED HEALTH CARE EDUCATION/TRAINING PROGRAM

## 2023-11-14 PROCEDURE — 370N000017 HC ANESTHESIA TECHNICAL FEE, PER MIN: Performed by: STUDENT IN AN ORGANIZED HEALTH CARE EDUCATION/TRAINING PROGRAM

## 2023-11-14 PROCEDURE — 250N000009 HC RX 250: Performed by: STUDENT IN AN ORGANIZED HEALTH CARE EDUCATION/TRAINING PROGRAM

## 2023-11-14 PROCEDURE — 250N000013 HC RX MED GY IP 250 OP 250 PS 637: Performed by: STUDENT IN AN ORGANIZED HEALTH CARE EDUCATION/TRAINING PROGRAM

## 2023-11-14 PROCEDURE — 999N000141 HC STATISTIC PRE-PROCEDURE NURSING ASSESSMENT: Performed by: STUDENT IN AN ORGANIZED HEALTH CARE EDUCATION/TRAINING PROGRAM

## 2023-11-14 PROCEDURE — 360N000077 HC SURGERY LEVEL 4, PER MIN: Performed by: STUDENT IN AN ORGANIZED HEALTH CARE EDUCATION/TRAINING PROGRAM

## 2023-11-14 PROCEDURE — 97161 PT EVAL LOW COMPLEX 20 MIN: CPT | Mod: GP

## 2023-11-14 DEVICE — IMPLANTABLE DEVICE
Type: IMPLANTABLE DEVICE | Site: KNEE | Status: FUNCTIONAL
Brand: PERSONA®

## 2023-11-14 DEVICE — IMPLANTABLE DEVICE
Type: IMPLANTABLE DEVICE | Site: KNEE | Status: FUNCTIONAL
Brand: PERSONA® VIVACIT-E®

## 2023-11-14 DEVICE — BONE CEMENT MIXING SYSTEM W/FEM PRESSURIZER 06065730000: Type: IMPLANTABLE DEVICE | Site: KNEE | Status: FUNCTIONAL

## 2023-11-14 DEVICE — FULL DOSE BONE CEMENT, 10 PACK CATALOG NUMBER IS 6191-1-010
Type: IMPLANTABLE DEVICE | Site: KNEE | Status: FUNCTIONAL
Brand: SIMPLEX

## 2023-11-14 DEVICE — IMPLANTABLE DEVICE
Type: IMPLANTABLE DEVICE | Site: KNEE | Status: FUNCTIONAL
Brand: PERSONA® NATURAL TIBIA®

## 2023-11-14 RX ORDER — HYDROMORPHONE HCL IN WATER/PF 6 MG/30 ML
0.2 PATIENT CONTROLLED ANALGESIA SYRINGE INTRAVENOUS
Status: DISCONTINUED | OUTPATIENT
Start: 2023-11-14 | End: 2023-11-15 | Stop reason: HOSPADM

## 2023-11-14 RX ORDER — NALOXONE HYDROCHLORIDE 0.4 MG/ML
0.2 INJECTION, SOLUTION INTRAMUSCULAR; INTRAVENOUS; SUBCUTANEOUS
Status: DISCONTINUED | OUTPATIENT
Start: 2023-11-14 | End: 2023-11-15 | Stop reason: HOSPADM

## 2023-11-14 RX ORDER — ASPIRIN 81 MG/1
81 TABLET ORAL 2 TIMES DAILY
Status: DISCONTINUED | OUTPATIENT
Start: 2023-11-14 | End: 2023-11-15 | Stop reason: HOSPADM

## 2023-11-14 RX ORDER — CEFAZOLIN SODIUM/WATER 2 G/20 ML
2 SYRINGE (ML) INTRAVENOUS
Status: COMPLETED | OUTPATIENT
Start: 2023-11-14 | End: 2023-11-14

## 2023-11-14 RX ORDER — PROCHLORPERAZINE MALEATE 5 MG
5 TABLET ORAL EVERY 6 HOURS PRN
Status: DISCONTINUED | OUTPATIENT
Start: 2023-11-14 | End: 2023-11-15 | Stop reason: HOSPADM

## 2023-11-14 RX ORDER — ACETAMINOPHEN 325 MG/1
650 TABLET ORAL EVERY 4 HOURS PRN
Status: DISCONTINUED | OUTPATIENT
Start: 2023-11-17 | End: 2023-11-15 | Stop reason: HOSPADM

## 2023-11-14 RX ORDER — HYDROMORPHONE HCL IN WATER/PF 6 MG/30 ML
0.2 PATIENT CONTROLLED ANALGESIA SYRINGE INTRAVENOUS EVERY 5 MIN PRN
Status: DISCONTINUED | OUTPATIENT
Start: 2023-11-14 | End: 2023-11-14 | Stop reason: HOSPADM

## 2023-11-14 RX ORDER — SODIUM CHLORIDE, SODIUM LACTATE, POTASSIUM CHLORIDE, CALCIUM CHLORIDE 600; 310; 30; 20 MG/100ML; MG/100ML; MG/100ML; MG/100ML
INJECTION, SOLUTION INTRAVENOUS CONTINUOUS
Status: DISCONTINUED | OUTPATIENT
Start: 2023-11-14 | End: 2023-11-14 | Stop reason: HOSPADM

## 2023-11-14 RX ORDER — TRANEXAMIC ACID 650 MG/1
1950 TABLET ORAL ONCE
Status: COMPLETED | OUTPATIENT
Start: 2023-11-14 | End: 2023-11-14

## 2023-11-14 RX ORDER — HYDROMORPHONE HCL IN WATER/PF 6 MG/30 ML
0.4 PATIENT CONTROLLED ANALGESIA SYRINGE INTRAVENOUS
Status: DISCONTINUED | OUTPATIENT
Start: 2023-11-14 | End: 2023-11-15 | Stop reason: HOSPADM

## 2023-11-14 RX ORDER — ACETAMINOPHEN 325 MG/1
650 TABLET ORAL EVERY 4 HOURS PRN
Qty: 100 TABLET | Refills: 0 | Status: SHIPPED | OUTPATIENT
Start: 2023-11-14

## 2023-11-14 RX ORDER — EPHEDRINE SULFATE 50 MG/ML
INJECTION, SOLUTION INTRAMUSCULAR; INTRAVENOUS; SUBCUTANEOUS PRN
Status: DISCONTINUED | OUTPATIENT
Start: 2023-11-14 | End: 2023-11-14

## 2023-11-14 RX ORDER — AMOXICILLIN 250 MG
1 CAPSULE ORAL 2 TIMES DAILY
Status: DISCONTINUED | OUTPATIENT
Start: 2023-11-14 | End: 2023-11-15 | Stop reason: HOSPADM

## 2023-11-14 RX ORDER — ONDANSETRON 2 MG/ML
INJECTION INTRAMUSCULAR; INTRAVENOUS PRN
Status: DISCONTINUED | OUTPATIENT
Start: 2023-11-14 | End: 2023-11-14

## 2023-11-14 RX ORDER — ACETAMINOPHEN 325 MG/1
975 TABLET ORAL EVERY 8 HOURS
Status: DISCONTINUED | OUTPATIENT
Start: 2023-11-14 | End: 2023-11-15 | Stop reason: HOSPADM

## 2023-11-14 RX ORDER — POLYETHYLENE GLYCOL 3350 17 G/17G
1 POWDER, FOR SOLUTION ORAL DAILY
Qty: 7 PACKET | Refills: 0 | Status: SHIPPED | OUTPATIENT
Start: 2023-11-14

## 2023-11-14 RX ORDER — LIDOCAINE HYDROCHLORIDE 20 MG/ML
INJECTION, SOLUTION INFILTRATION; PERINEURAL PRN
Status: DISCONTINUED | OUTPATIENT
Start: 2023-11-14 | End: 2023-11-14

## 2023-11-14 RX ORDER — OXYCODONE HYDROCHLORIDE 5 MG/1
5 TABLET ORAL EVERY 4 HOURS PRN
Status: DISCONTINUED | OUTPATIENT
Start: 2023-11-14 | End: 2023-11-15 | Stop reason: HOSPADM

## 2023-11-14 RX ORDER — IBUPROFEN 600 MG/1
600 TABLET, FILM COATED ORAL EVERY 6 HOURS PRN
Qty: 30 TABLET | Refills: 0 | Status: SHIPPED | OUTPATIENT
Start: 2023-11-14

## 2023-11-14 RX ORDER — LIDOCAINE 40 MG/G
CREAM TOPICAL
Status: DISCONTINUED | OUTPATIENT
Start: 2023-11-14 | End: 2023-11-15 | Stop reason: HOSPADM

## 2023-11-14 RX ORDER — GLYCOPYRROLATE 0.2 MG/ML
INJECTION, SOLUTION INTRAMUSCULAR; INTRAVENOUS PRN
Status: DISCONTINUED | OUTPATIENT
Start: 2023-11-14 | End: 2023-11-14

## 2023-11-14 RX ORDER — NALOXONE HYDROCHLORIDE 0.4 MG/ML
0.4 INJECTION, SOLUTION INTRAMUSCULAR; INTRAVENOUS; SUBCUTANEOUS
Status: DISCONTINUED | OUTPATIENT
Start: 2023-11-14 | End: 2023-11-15 | Stop reason: HOSPADM

## 2023-11-14 RX ORDER — FENTANYL CITRATE 50 UG/ML
25 INJECTION, SOLUTION INTRAMUSCULAR; INTRAVENOUS EVERY 5 MIN PRN
Status: DISCONTINUED | OUTPATIENT
Start: 2023-11-14 | End: 2023-11-14 | Stop reason: HOSPADM

## 2023-11-14 RX ORDER — CEFAZOLIN SODIUM 1 G/3ML
1 INJECTION, POWDER, FOR SOLUTION INTRAMUSCULAR; INTRAVENOUS EVERY 8 HOURS
Qty: 10 ML | Refills: 0 | Status: COMPLETED | OUTPATIENT
Start: 2023-11-14 | End: 2023-11-15

## 2023-11-14 RX ORDER — BISACODYL 10 MG
10 SUPPOSITORY, RECTAL RECTAL DAILY PRN
Status: DISCONTINUED | OUTPATIENT
Start: 2023-11-14 | End: 2023-11-15 | Stop reason: HOSPADM

## 2023-11-14 RX ORDER — ONDANSETRON 4 MG/1
4 TABLET, ORALLY DISINTEGRATING ORAL EVERY 6 HOURS PRN
Status: DISCONTINUED | OUTPATIENT
Start: 2023-11-14 | End: 2023-11-15 | Stop reason: HOSPADM

## 2023-11-14 RX ORDER — POLYETHYLENE GLYCOL 3350 17 G/17G
17 POWDER, FOR SOLUTION ORAL DAILY
Status: DISCONTINUED | OUTPATIENT
Start: 2023-11-15 | End: 2023-11-15 | Stop reason: HOSPADM

## 2023-11-14 RX ORDER — KETOROLAC TROMETHAMINE 15 MG/ML
15 INJECTION, SOLUTION INTRAMUSCULAR; INTRAVENOUS EVERY 6 HOURS
Status: COMPLETED | OUTPATIENT
Start: 2023-11-14 | End: 2023-11-15

## 2023-11-14 RX ORDER — LIDOCAINE 40 MG/G
CREAM TOPICAL
Status: DISCONTINUED | OUTPATIENT
Start: 2023-11-14 | End: 2023-11-14 | Stop reason: HOSPADM

## 2023-11-14 RX ORDER — ONDANSETRON 2 MG/ML
4 INJECTION INTRAMUSCULAR; INTRAVENOUS EVERY 6 HOURS PRN
Status: DISCONTINUED | OUTPATIENT
Start: 2023-11-14 | End: 2023-11-15 | Stop reason: HOSPADM

## 2023-11-14 RX ORDER — FENTANYL CITRATE 50 UG/ML
50 INJECTION, SOLUTION INTRAMUSCULAR; INTRAVENOUS EVERY 5 MIN PRN
Status: DISCONTINUED | OUTPATIENT
Start: 2023-11-14 | End: 2023-11-14 | Stop reason: HOSPADM

## 2023-11-14 RX ORDER — PROPOFOL 10 MG/ML
INJECTION, EMULSION INTRAVENOUS PRN
Status: DISCONTINUED | OUTPATIENT
Start: 2023-11-14 | End: 2023-11-14

## 2023-11-14 RX ORDER — OXYCODONE HYDROCHLORIDE 5 MG/1
5-10 TABLET ORAL EVERY 4 HOURS PRN
Qty: 26 TABLET | Refills: 0 | Status: SHIPPED | OUTPATIENT
Start: 2023-11-14 | End: 2023-11-21

## 2023-11-14 RX ORDER — ASPIRIN 81 MG/1
81 TABLET ORAL 2 TIMES DAILY
Qty: 60 TABLET | Refills: 0 | Status: SHIPPED | OUTPATIENT
Start: 2023-11-14

## 2023-11-14 RX ORDER — HYDROMORPHONE HCL IN WATER/PF 6 MG/30 ML
0.4 PATIENT CONTROLLED ANALGESIA SYRINGE INTRAVENOUS EVERY 5 MIN PRN
Status: DISCONTINUED | OUTPATIENT
Start: 2023-11-14 | End: 2023-11-14 | Stop reason: HOSPADM

## 2023-11-14 RX ORDER — ONDANSETRON 2 MG/ML
4 INJECTION INTRAMUSCULAR; INTRAVENOUS EVERY 30 MIN PRN
Status: DISCONTINUED | OUTPATIENT
Start: 2023-11-14 | End: 2023-11-14 | Stop reason: HOSPADM

## 2023-11-14 RX ORDER — SODIUM CHLORIDE, SODIUM LACTATE, POTASSIUM CHLORIDE, CALCIUM CHLORIDE 600; 310; 30; 20 MG/100ML; MG/100ML; MG/100ML; MG/100ML
INJECTION, SOLUTION INTRAVENOUS CONTINUOUS
Status: DISCONTINUED | OUTPATIENT
Start: 2023-11-14 | End: 2023-11-15 | Stop reason: HOSPADM

## 2023-11-14 RX ORDER — CEFAZOLIN SODIUM/WATER 2 G/20 ML
2 SYRINGE (ML) INTRAVENOUS SEE ADMIN INSTRUCTIONS
Status: DISCONTINUED | OUTPATIENT
Start: 2023-11-14 | End: 2023-11-14 | Stop reason: HOSPADM

## 2023-11-14 RX ORDER — FENTANYL CITRATE 50 UG/ML
INJECTION, SOLUTION INTRAMUSCULAR; INTRAVENOUS PRN
Status: DISCONTINUED | OUTPATIENT
Start: 2023-11-14 | End: 2023-11-14

## 2023-11-14 RX ORDER — OXYCODONE HYDROCHLORIDE 10 MG/1
10 TABLET ORAL EVERY 4 HOURS PRN
Status: DISCONTINUED | OUTPATIENT
Start: 2023-11-14 | End: 2023-11-15 | Stop reason: HOSPADM

## 2023-11-14 RX ORDER — ONDANSETRON 4 MG/1
4 TABLET, ORALLY DISINTEGRATING ORAL EVERY 30 MIN PRN
Status: DISCONTINUED | OUTPATIENT
Start: 2023-11-14 | End: 2023-11-14 | Stop reason: HOSPADM

## 2023-11-14 RX ORDER — AMOXICILLIN 250 MG
1-2 CAPSULE ORAL 2 TIMES DAILY
Qty: 30 TABLET | Refills: 0 | Status: SHIPPED | OUTPATIENT
Start: 2023-11-14

## 2023-11-14 RX ADMIN — Medication 2 G: at 09:17

## 2023-11-14 RX ADMIN — Medication 5 MG: at 11:10

## 2023-11-14 RX ADMIN — CEFAZOLIN 1 G: 1 INJECTION, POWDER, FOR SOLUTION INTRAMUSCULAR; INTRAVENOUS at 16:36

## 2023-11-14 RX ADMIN — PHENYLEPHRINE HYDROCHLORIDE 100 MCG: 10 INJECTION INTRAVENOUS at 09:41

## 2023-11-14 RX ADMIN — SODIUM CHLORIDE, POTASSIUM CHLORIDE, SODIUM LACTATE AND CALCIUM CHLORIDE: 600; 310; 30; 20 INJECTION, SOLUTION INTRAVENOUS at 17:18

## 2023-11-14 RX ADMIN — PROPOFOL 10 MG: 10 INJECTION, EMULSION INTRAVENOUS at 10:04

## 2023-11-14 RX ADMIN — PROPOFOL 20 MG: 10 INJECTION, EMULSION INTRAVENOUS at 10:13

## 2023-11-14 RX ADMIN — Medication 5 MG: at 09:44

## 2023-11-14 RX ADMIN — HYDROMORPHONE HYDROCHLORIDE 0.5 MG: 1 INJECTION, SOLUTION INTRAMUSCULAR; INTRAVENOUS; SUBCUTANEOUS at 09:56

## 2023-11-14 RX ADMIN — GLYCOPYRROLATE 0.2 MG: 0.2 INJECTION, SOLUTION INTRAMUSCULAR; INTRAVENOUS at 09:29

## 2023-11-14 RX ADMIN — Medication 5 MG: at 09:47

## 2023-11-14 RX ADMIN — HYDROMORPHONE HYDROCHLORIDE 0.5 MG: 1 INJECTION, SOLUTION INTRAMUSCULAR; INTRAVENOUS; SUBCUTANEOUS at 09:47

## 2023-11-14 RX ADMIN — ACETAMINOPHEN 975 MG: 325 TABLET, FILM COATED ORAL at 20:26

## 2023-11-14 RX ADMIN — ONDANSETRON 4 MG: 2 INJECTION INTRAMUSCULAR; INTRAVENOUS at 11:23

## 2023-11-14 RX ADMIN — KETOROLAC TROMETHAMINE 15 MG: 15 INJECTION, SOLUTION INTRAMUSCULAR; INTRAVENOUS at 12:46

## 2023-11-14 RX ADMIN — OXYCODONE HYDROCHLORIDE 5 MG: 5 TABLET ORAL at 16:33

## 2023-11-14 RX ADMIN — PHENYLEPHRINE HYDROCHLORIDE 50 MCG: 10 INJECTION INTRAVENOUS at 11:26

## 2023-11-14 RX ADMIN — SENNOSIDES AND DOCUSATE SODIUM 1 TABLET: 8.6; 5 TABLET ORAL at 19:28

## 2023-11-14 RX ADMIN — Medication 5 MG: at 11:17

## 2023-11-14 RX ADMIN — FENTANYL CITRATE 100 MCG: 50 INJECTION INTRAMUSCULAR; INTRAVENOUS at 09:29

## 2023-11-14 RX ADMIN — SODIUM CHLORIDE, POTASSIUM CHLORIDE, SODIUM LACTATE AND CALCIUM CHLORIDE: 600; 310; 30; 20 INJECTION, SOLUTION INTRAVENOUS at 10:25

## 2023-11-14 RX ADMIN — ASPIRIN 81 MG: 81 TABLET, COATED ORAL at 19:28

## 2023-11-14 RX ADMIN — HYDROMORPHONE HYDROCHLORIDE 0.2 MG: 0.2 INJECTION, SOLUTION INTRAMUSCULAR; INTRAVENOUS; SUBCUTANEOUS at 12:47

## 2023-11-14 RX ADMIN — PHENYLEPHRINE HYDROCHLORIDE 50 MCG: 10 INJECTION INTRAVENOUS at 09:58

## 2023-11-14 RX ADMIN — PHENYLEPHRINE HYDROCHLORIDE 50 MCG: 10 INJECTION INTRAVENOUS at 11:05

## 2023-11-14 RX ADMIN — PROPOFOL 10 MG: 10 INJECTION, EMULSION INTRAVENOUS at 11:21

## 2023-11-14 RX ADMIN — ACETAMINOPHEN 975 MG: 325 TABLET, FILM COATED ORAL at 12:47

## 2023-11-14 RX ADMIN — TRANEXAMIC ACID 1950 MG: 650 TABLET ORAL at 07:39

## 2023-11-14 RX ADMIN — PROPOFOL 60 MCG/KG/MIN: 10 INJECTION, EMULSION INTRAVENOUS at 09:31

## 2023-11-14 RX ADMIN — HYDROMORPHONE HYDROCHLORIDE 0.4 MG: 0.2 INJECTION, SOLUTION INTRAMUSCULAR; INTRAVENOUS; SUBCUTANEOUS at 12:27

## 2023-11-14 RX ADMIN — OXYCODONE HYDROCHLORIDE 5 MG: 5 TABLET ORAL at 20:26

## 2023-11-14 RX ADMIN — LIDOCAINE HYDROCHLORIDE 30 MG: 20 INJECTION, SOLUTION INFILTRATION; PERINEURAL at 09:29

## 2023-11-14 RX ADMIN — SODIUM CHLORIDE, POTASSIUM CHLORIDE, SODIUM LACTATE AND CALCIUM CHLORIDE: 600; 310; 30; 20 INJECTION, SOLUTION INTRAVENOUS at 08:09

## 2023-11-14 RX ADMIN — FENTANYL CITRATE 50 MCG: 50 INJECTION, SOLUTION INTRAMUSCULAR; INTRAVENOUS at 12:18

## 2023-11-14 RX ADMIN — PROPOFOL 10 MG: 10 INJECTION, EMULSION INTRAVENOUS at 11:20

## 2023-11-14 RX ADMIN — KETOROLAC TROMETHAMINE 15 MG: 15 INJECTION, SOLUTION INTRAMUSCULAR; INTRAVENOUS at 19:07

## 2023-11-14 RX ADMIN — PHENYLEPHRINE HYDROCHLORIDE 50 MCG: 10 INJECTION INTRAVENOUS at 09:53

## 2023-11-14 RX ADMIN — FENTANYL CITRATE 50 MCG: 50 INJECTION INTRAMUSCULAR; INTRAVENOUS at 10:15

## 2023-11-14 RX ADMIN — Medication 5 MG: at 09:51

## 2023-11-14 RX ADMIN — PROPOFOL 180 MG: 10 INJECTION, EMULSION INTRAVENOUS at 09:29

## 2023-11-14 RX ADMIN — FENTANYL CITRATE 50 MCG: 50 INJECTION, SOLUTION INTRAMUSCULAR; INTRAVENOUS at 12:00

## 2023-11-14 ASSESSMENT — ACTIVITIES OF DAILY LIVING (ADL)
ADLS_ACUITY_SCORE: 22
ADLS_ACUITY_SCORE: 26
ADLS_ACUITY_SCORE: 26
ADLS_ACUITY_SCORE: 22
ADLS_ACUITY_SCORE: 19
ADLS_ACUITY_SCORE: 19
ADLS_ACUITY_SCORE: 16
ADLS_ACUITY_SCORE: 20
ADLS_ACUITY_SCORE: 22

## 2023-11-14 NOTE — PLAN OF CARE
Patient vital signs are at baseline: Yes, de-sats to 88% when sleeping. 1L NC applied   Patient able to ambulate as they were prior to admission or with assist devices provided by therapies during their stay:  Yes, A1 gait belt and walker   Patient MUST void prior to discharge:  Yes, bladder scan for 8ml   Patient able to tolerate oral intake:  Yes  Pain has adequate pain control using Oral analgesics:  Yes, PRN 5mg oxy given for PT, scheduled Toradol.  Does patient have an identified :  Yes  Has goal D/C date and time been discussed with patient:  Yes    Encouraged IS. CMS intact. Dressing clean dry and intact. Calm and pleasant with cares. Will continue to provide supportive care.

## 2023-11-14 NOTE — ANESTHESIA CARE TRANSFER NOTE
Patient: Deepali Crawford    Procedure: Procedure(s):  Right total knee arthroplasty       Diagnosis: Primary osteoarthritis of right knee [M17.11]  Diagnosis Additional Information: No value filed.    Anesthesia Type:   General     Note:    Oropharynx: oropharynx clear of all foreign objects and spontaneously breathing  Level of Consciousness: drowsy  Oxygen Supplementation: face mask  Level of Supplemental Oxygen (L/min / FiO2): 8  Independent Airway: airway patency satisfactory and stable  Dentition: dentition unchanged  Vital Signs Stable: post-procedure vital signs reviewed and stable  Report to RN Given: handoff report given  Patient transferred to: PACU    Handoff Report: Identifed the Patient, Identified the Reponsible Provider, Reviewed the pertinent medical history, Discussed the surgical course, Reviewed Intra-OP anesthesia mangement and issues during anesthesia, Set expectations for post-procedure period and Allowed opportunity for questions and acknowledgement of understanding      Vitals:  Vitals Value Taken Time   BP     Temp     Pulse 86 11/14/23 1146   Resp 13 11/14/23 1146   SpO2 98 % 11/14/23 1146   Vitals shown include unfiled device data.    Electronically Signed By: CAROL Ahuja CRNA  November 14, 2023  11:47 AM

## 2023-11-14 NOTE — PROGRESS NOTES
11/14/23 1602   Appointment Info   Signing Clinician's Name / Credentials (PT) Ama Das DPT   Rehab Comments (PT) RLE WBAT   Quick Adds   Quick Adds Certification   Living Environment   People in Home spouse   Current Living Arrangements house   Home Accessibility stairs to enter home;stairs within home   Number of Stairs, Main Entrance 2   Stair Railings, Main Entrance none   Number of Stairs, Within Home, Primary greater than 10 stairs   Stair Railings, Within Home, Primary railings safe and in good condition   Transportation Anticipated family or friend will provide   Living Environment Comments 2 MOSES then all needs met. Tub shower with shower chair and grab bars. Raised toilet seat.   Self-Care   Usual Activity Tolerance good   Current Activity Tolerance moderate   Regular Exercise No   Equipment Currently Used at Home none;grab bar, tub/shower;grab bar, toilet   Fall history within last six months no   Activity/Exercise/Self-Care Comment IND at baseline. Borrowing a walker and cane.   General Information   Onset of Illness/Injury or Date of Surgery 11/14/23   Referring Physician Tom Stauffer MD   Patient/Family Therapy Goals Statement (PT) return home   Pertinent History of Current Problem (include personal factors and/or comorbidities that impact the POC) Pt is 72 yo female who underwent R TKA on 11/14/2023.   Existing Precautions/Restrictions fall;weight bearing   Weight-Bearing Status - RLE weight-bearing as tolerated   Cognition   Affect/Mental Status (Cognition) WNL   Orientation Status (Cognition) oriented x 4   Follows Commands (Cognition) WNL   Pain Assessment   Patient Currently in Pain Yes, see Vital Sign flowsheet   Integumentary/Edema   Integumentary/Edema Comments RLE ACE bandage intact   Posture    Posture Forward head position   Range of Motion (ROM)   ROM Comment R knee 0-90 degrees   Strength (Manual Muscle Testing)   Strength (Manual Muscle Testing) Deficits observed during  functional mobility   Strength Comments able to SLR BLEs   Bed Mobility   Comment, (Bed Mobility) supine<>sit with CGA   Transfers   Comment, (Transfers) sit<>stand with FWW and CGA   Gait/Stairs (Locomotion)   Comment, (Gait/Stairs) amb with FWW and CGA, step-to   Balance   Balance Comments needing FWW and CGA   Sensory Examination   Sensory Perception patient reports no sensory changes   Clinical Impression   Criteria for Skilled Therapeutic Intervention Yes, treatment indicated   PT Diagnosis (PT) impaired functional mobility   Influenced by the following impairments weakness, pain, post op status   Functional limitations due to impairments difficulty with bed mobility, transfers, ambulation, stairs   Clinical Presentation (PT Evaluation Complexity) stable   Clinical Presentation Rationale clinical judgement   Clinical Decision Making (Complexity) low complexity   Planned Therapy Interventions (PT) balance training;bed mobility training;gait training;home exercise program;neuromuscular re-education;patient/family education;ROM (range of motion);stair training;strengthening;transfer training;progressive activity/exercise;risk factor education;home program guidelines   Risk & Benefits of therapy have been explained evaluation/treatment results reviewed;care plan/treatment goals reviewed;risks/benefits reviewed;current/potential barriers reviewed;participants voiced agreement with care plan;participants included;patient   PT Total Evaluation Time   PT Eval, Low Complexity Minutes (33237) 10   Therapy Certification   Start of care date 11/14/23   Certification date from 11/14/23   Certification date to 11/16/23   Medical Diagnosis s/p TKA   Physical Therapy Goals   PT Frequency Daily   PT Predicted Duration/Target Date for Goal Attainment 11/16/23   PT Goals Bed Mobility;Transfers;Gait;Stairs   PT: Bed Mobility Independent;Supine to/from sit   PT: Transfers Modified independent;Sit to/from stand;Assistive device    PT: Gait Modified independent;Assistive device;Greater than 200 feet   PT: Stairs Minimal assist;2 stairs;Assistive device   PT Discharge Planning   PT Plan PT: TKA handout, IND bed mob, repeated STS, 2 stairs no rail with SPC   PT Discharge Recommendation (DC Rec)   (defer to ortho)   PT Rationale for DC Rec Anticipate with additional PT session that pt will meet IP PT goals and be able to d/c home with assist of spouse.   PT Brief overview of current status A x 1 FWW   PT Equipment Needed at Discharge   (borrowing FWW and SPC)   Total Session Time   Total Session Time (sum of timed and untimed services) 10   [facility]  OUTPATIENT PHYSICAL THERAPY EVALUATION  PLAN OF TREATMENT FOR OUTPATIENT REHABILITATION  (COMPLETE FOR INITIAL CLAIMS ONLY)  Patient's Last Name, First Name, M.I.  YOB: 1950  May,Deepali BOWSER                        Provider's Name  [facility] Medical Record No.  [id number]                             Onset Date:  11/14/23   Start of Care Date:  11/14/23   Type:     _X_PT   ___OT   ___SLP Medical Diagnosis:  s/p TKA              PT Diagnosis:  impaired functional mobility Visits from SOC:  1     See note for plan of treatment, functional goals and certification details    I CERTIFY THE NEED FOR THESE SERVICES FURNISHED UNDER        THIS PLAN OF TREATMENT AND WHILE UNDER MY CARE     (Physician co-signature of this document indicates review and certification of the therapy plan).
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
## 2023-11-14 NOTE — ANESTHESIA POSTPROCEDURE EVALUATION
Patient: Deepali Crawford    Procedure: Procedure(s):  Right total knee arthroplasty       Anesthesia Type:  General    Note:  Disposition: Admission   Postop Pain Control: Uneventful            Sign Out: Well controlled pain   PONV: No   Neuro/Psych: Uneventful            Sign Out: Acceptable/Baseline neuro status   Airway/Respiratory: Uneventful            Sign Out: Acceptable/Baseline resp. status   CV/Hemodynamics: Uneventful            Sign Out: Acceptable CV status; No obvious hypovolemia; No obvious fluid overload   Other NRE: NONE   DID A NON-ROUTINE EVENT OCCUR? No           Last vitals:  Vitals Value Taken Time   /72 11/14/23 1230   Temp 97.3  F (36.3  C) 11/14/23 1144   Pulse 58 11/14/23 1230   Resp 25 11/14/23 1205   SpO2 99 % 11/14/23 1234   Vitals shown include unfiled device data.    Electronically Signed By: Joshua Rangel MD  November 14, 2023  12:35 PM

## 2023-11-14 NOTE — PLAN OF CARE
Goal Outcome Evaluation:      Plan of Care Reviewed With: patient, spouse    Overall Patient Progress: improvingOverall Patient Progress: improving  Patient vital signs are at baseline: No,  Reason:  2.0L O2  Patient able to ambulate as they were prior to admission or with assist devices provided by therapies during their stay:  No,  Reason:  Patient has pivoted to commode but has not ambulated.  Patient MUST void prior to discharge:  Yes  Patient able to tolerate oral intake:  No,  Reason:  Has not eaten yet  Pain has adequate pain control using Oral analgesics:  No,  Reason:  TBD  Does patient have an identified :  Yes  Has goal D/C date and time been discussed with patient:  Yes    Pt arrived on unit at 1350, oriented to room. Up to BSC, pt voided x1. Rt knee ACE wrapped. Dressing CDI. Ice in place. CMS intact. Pt very sleepy, rating pain 5/10 but sleeping. PIV running LR @50mLs/hr. , Junior, at the bedside. Plan is home tomorrow.

## 2023-11-14 NOTE — OP NOTE
[facility]    Operative Note    Pre-operative diagnosis: 73-year-old female presenting with chronic right knee pain due to end-stage osteophytic changes which are insufficiently responding to nonsurgical treatment options.   Post-operative diagnosis Same as pre-operative diagnosis    Procedure: Right total knee arthroplasty, Right - Knee    Surgeon: Surgeon(s) and Role:     * Tom Stauffer MD - Primary     * Cm Jay PA-SHIMA - Assisting      * David Lange - Fellow    Anesthesia: General   Estimated Blood Loss: 200 ml    Drains: None  Specimens: * No specimens in log *  Findings:   None.  Complications: None.  Implants:   Implant Name Type Inv. Item Serial No.  Lot No. LRB No. Used Action   BONE CEMENT MIXING SYSTEM W/FEM PRESSURIZER [id number] - [id number] Cement, Bone BONE CEMENT MIXING SYSTEM W/FEM PRESSURIZER [id number]  [employer]  Right 1 Used as a Supply   BONE CEMENT SIMPLEX FULL DOSE 6191-1-001 - [id number] Cement, Bone BONE CEMENT SIMPLEX FULL DOSE 6191-1-001  [employer] [id number] Right 1 Implanted   IMP PATELLA ZIM KNEE ALL RAHUL 32MM -316-32 - [id number] Total Joint Component/Insert IMP PATELLA ZIM KNEE ALL RAHUL 32MM -569-32  [employer] [id number] Right 1 Implanted   KNEE FEMUR CR CEMENT CCR STD SZ 7 R - [id number] Total Joint Component/Insert KNEE FEMUR CR CEMENT CCR STD SZ 7 R  [employer] [id number] Right 1 Implanted   IMP TIBIAL ZIM PSN NP STM 5DEG MARKUS SNOW -720-02 - [id number] Total Joint Component/Insert IMP TIBIAL ZIM PSN NP STM 5DEG MARKUS SNOW -465-02  [employer] [id number] Right 1 Implanted   SURFACE ARTC PERSONA 6-7 C-D KN RT TIB FX BRNG STRL LF - SN/A Total Joint Component/Insert SURFACE ARTC PERSONA 6-7 C-D KN RT TIB FX BRNG STRL LF N/A [employer] [id number] Right 1 Implanted       The presence of ERNESTINE Wills was essential throughout this case for   assistance with patient transfer  to and from the operative bed, draping, irrigation, cautery usage, retraction, implant placement, cement removal, arthrotomy closure, incisional closure, dressing placement.    Components used:  Leonarda Persona Size 7 Femoral Component  Leonarda Persona Size D Tibial Component  Leonarda Size 32 Patellar Button    Leonarda Persona MC Polyethylene Liner, Size 10 mm     Description of procedure:      Patient was seen in the preoperative area where procedure, risks and complications, expectations and rehabilitation were discussed once more.  All questions were answered.  Patient understands and agrees to the treatment plan as set forth.  Informed consent was obtained and the right lower extremity was marked.  Patient was then taken to the operating room where general anesthesia was induced.     Patient was positioned supine with a bump under the ipsilateral buttock. Bony prominences were well padded and the patient was secured to the table in the standard fashion.  An SCD was placed on the nonoperative leg.  Preoperative range of motion showed a flexion/extension of 120/0/0.      A tourniquet was placed on the operative thigh and the patient was prepped and draped in the normal sterile fashion.        After the completion of a timeout procedure a standard midline incision was made. Dissection was carried down to the knee retinaculum and the quadriceps tendon. A standard medial parapatellar arthrotomy was performed. Subperiosteal dissection was carried out along the medial proximal tibia. The fat pad was removed.  Care was taken to protect the patellar tendon and extensor mechanism during fat pad removal. The tissue along the anterior aspect of the distal femur was also removed.  The patella was subluxed and the knee was flexed.       The ACL was released.  A drill was advanced down the femoral canal in a retrograde direction. The sword was set at 6 degrees of valgus in accordance with the preoperative plan and was advanced  into the canal.  The distal femoral cutting block was then placed at +0 mm resection and pinned into place. The ioana was removed and the distal femur cuts were made medially and laterally.  The cutting block was removed and the ioana with the alignment piece was inserted into the canal to ensure proper angle of the cut and a flat cut.       Attention was then directed towards the proximal tibia.  Part of the medial and lateral meniscus and soft tissue was removed to expose the medial and lateral tibial plateau. Retractors were placed around the knee to obtain good visualization, then the extramedullary tibial alignment guide was placed in the appropriate position. The 2-10 guide was used to select the resection level of the tibia and the cutting guide was pinned into position.  Great care was taken to place the proximal cutting guide in the appropriate varus/valgus and posterior slope orientation.  With care directed towards preserving the posterior nerves and blood vessels and the medial collateral ligament, the saw was used to cut the proximal tibia.  Next the 10 mm sizing block was put in place to ensure proper alignment.  It showed adequate alignment using the drop ioana as well as good balancing of soft tissues.     We turned our attention to the back to the distal femur.  The posterior referencing femoral sizing block was used and the femur was measured to be a size 7.  Two holes were drilled to obtain 3 degrees of external rotation with respect to the posterior condylar axis of the femur.  The femoral cutting block was then slid over the pins and fixed onto the bone.  The anterior cut, posterior cut, anterior and posterior chamfer cuts were made.  The block was then removed and excess bone fragments were removed.   A lamina  was used to properly visualize the posterior knee and meniscal remnants.  Using electrocautery the residual medial and lateral menisci were removed.  Care was taken to coagulate the  lateral genicular vessels.  A curved osteotome was used to remove posterior osteophytes.  Great care was taken to protect the popliteus tendon laterally.  Our anesthetic injection was then introduced through the posterior capsule with extreme care directed towards not injuring the posterior neurovascular structures. The trial components (femur size 7) were placed and confirmed appropriate sizing of the flexion and extension gaps.  The drop ioana was used to confirm proper alignment of the cut.  A range of motion of 0 to 130 degrees with a well balanced knee in both flexion and extension was obtained.  Appropriate external rotation of the tibial trial was marked.The excess bone was removed.       The patella was then exposed.  Denervation and soft tissue release around the patella for visualization utilizing electrocautery. The patella thickness was measured with a caliper to be 23, and a measured resection of 8 mm was made.  A caliper was then used to measure the residual thickness of the patella to be 15.  The patella was measured with the lollipops and found to be a size 32. The drill guide was placed and the three lug holes were drilled. The patella trial was then placed and the knee was ranged. The patella was found to track well.  Now the tourniquet was inflated to 250 mmHg.    Femoral lug holes were drilled.  All trial components were removed and the proximal tibia was again exposed and sized. The tibia was measured to be a size D.   The tibial trial was placed into proper rotation and pinned into place.  The stove pipe was used to guide the drill, and then the wing punch was used. The tibial trial was then removed.      Pulsitile lavage was used to clean the bone in preparation for cementing. The bone was dried. Cement was mixed, and then all the components were cemented into place. While the cement was hardening, the remainder of the anesthetic injection was spread around the deep retinacular layer and the  subcutaneous layer with a spinal needle.  The knee was irrigated with betadine lavage for approximately 3 minutes.  Once the cement had hardened, the excess cement was removed.    The tourniquet was released.  Hemostasis was obtained.       The MC 10 mm liner fit best and the knee had full extension to 130 degrees and was stable to anterior and posterior stress in flexion and extension as well as varus/valgus stress in 0, 30, and 90 degrees of flexion.  The trial liner was removed and the real liner was placed.       The knee was again copiously irrigated.   Closure was performed with a #1  Vicryl interrupted sutures in the retinacular layer, 0 Vicryl and 2-0 Vicryl interrupted suture in the  subcutaneous tissues, and 3-0 monocryl in the skin.  A sterile dressing was applied.  The patient was then awakened, transferred to the [facility], and brought to the recovery room in stable condition. There were no complications.     POSTOPERATIVE PLAN:  Weight bearing: Weightbearing as tolerated  Anticoagulation: Aspirin 162 mg daily for 4 weeks  Precautions: No precautions  Pain control and monitoring  PT/OT  Antibiotics per protocol  X-ray in PACU  Discharge today or tomorrow when pain well controlled and ambulating safely.        Tom Stauffer MD      Adult Reconstruction  [facility] Department of Orthopaedic Surgery  Pager [phone]

## 2023-11-14 NOTE — ANESTHESIA PROCEDURE NOTES
Airway       Patient location during procedure: OR       Procedure Start/Stop Times: 11/14/2023 9:33 AM  Staff -        CRNA: Arpan Lee APRN CRNA       Performed By: CRNA  Consent for Airway        Urgency: elective  Indications and Patient Condition       Indications for airway management: vini-procedural       Induction type:intravenous       Mask difficulty assessment: 1 - vent by mask    Final Airway Details       Final airway type: supraglottic airway    Supraglottic Airway Details        Type: LMA       Brand: I-Gel       LMA size: 4    Post intubation assessment        Placement verified by: capnometry, equal breath sounds and chest rise        Number of attempts at approach: 1       Number of other approaches attempted: 0       Secured with: commercial tube degroot       Ease of procedure: easy       Dentition: Intact and Unchanged    Medication(s) Administered   Medication Administration Time: 11/14/2023 9:33 AM

## 2023-11-15 ENCOUNTER — APPOINTMENT (OUTPATIENT)
Dept: PHYSICAL THERAPY | Facility: CLINIC | Age: 73
End: 2023-11-15
Attending: STUDENT IN AN ORGANIZED HEALTH CARE EDUCATION/TRAINING PROGRAM
Payer: COMMERCIAL

## 2023-11-15 ENCOUNTER — APPOINTMENT (OUTPATIENT)
Dept: OCCUPATIONAL THERAPY | Facility: CLINIC | Age: 73
End: 2023-11-15
Attending: STUDENT IN AN ORGANIZED HEALTH CARE EDUCATION/TRAINING PROGRAM
Payer: COMMERCIAL

## 2023-11-15 VITALS
TEMPERATURE: 97.3 F | BODY MASS INDEX: 30.86 KG/M2 | DIASTOLIC BLOOD PRESSURE: 65 MMHG | HEART RATE: 83 BPM | SYSTOLIC BLOOD PRESSURE: 136 MMHG | HEIGHT: 64 IN | OXYGEN SATURATION: 100 % | RESPIRATION RATE: 18 BRPM | WEIGHT: 180.78 LBS

## 2023-11-15 LAB
HGB BLD-MCNC: 11.5 G/DL (ref 11.7–15.7)
HOLD SPECIMEN: NORMAL

## 2023-11-15 PROCEDURE — 97535 SELF CARE MNGMENT TRAINING: CPT | Mod: GO | Performed by: REHABILITATION PRACTITIONER

## 2023-11-15 PROCEDURE — 250N000013 HC RX MED GY IP 250 OP 250 PS 637: Performed by: STUDENT IN AN ORGANIZED HEALTH CARE EDUCATION/TRAINING PROGRAM

## 2023-11-15 PROCEDURE — 85018 HEMOGLOBIN: CPT | Performed by: STUDENT IN AN ORGANIZED HEALTH CARE EDUCATION/TRAINING PROGRAM

## 2023-11-15 PROCEDURE — 250N000011 HC RX IP 250 OP 636: Performed by: STUDENT IN AN ORGANIZED HEALTH CARE EDUCATION/TRAINING PROGRAM

## 2023-11-15 PROCEDURE — 97165 OT EVAL LOW COMPLEX 30 MIN: CPT | Mod: GO | Performed by: REHABILITATION PRACTITIONER

## 2023-11-15 PROCEDURE — 36415 COLL VENOUS BLD VENIPUNCTURE: CPT | Performed by: STUDENT IN AN ORGANIZED HEALTH CARE EDUCATION/TRAINING PROGRAM

## 2023-11-15 PROCEDURE — 97110 THERAPEUTIC EXERCISES: CPT | Mod: GP | Performed by: PHYSICAL THERAPIST

## 2023-11-15 PROCEDURE — 97116 GAIT TRAINING THERAPY: CPT | Mod: GP | Performed by: PHYSICAL THERAPIST

## 2023-11-15 RX ADMIN — KETOROLAC TROMETHAMINE 15 MG: 15 INJECTION, SOLUTION INTRAMUSCULAR; INTRAVENOUS at 00:45

## 2023-11-15 RX ADMIN — CEFAZOLIN 1 G: 1 INJECTION, POWDER, FOR SOLUTION INTRAMUSCULAR; INTRAVENOUS at 00:54

## 2023-11-15 RX ADMIN — ACETAMINOPHEN 975 MG: 325 TABLET, FILM COATED ORAL at 05:36

## 2023-11-15 RX ADMIN — ASPIRIN 81 MG: 81 TABLET, COATED ORAL at 07:43

## 2023-11-15 RX ADMIN — KETOROLAC TROMETHAMINE 15 MG: 15 INJECTION, SOLUTION INTRAMUSCULAR; INTRAVENOUS at 07:43

## 2023-11-15 RX ADMIN — OXYCODONE HYDROCHLORIDE 5 MG: 5 TABLET ORAL at 09:46

## 2023-11-15 RX ADMIN — SENNOSIDES AND DOCUSATE SODIUM 1 TABLET: 8.6; 5 TABLET ORAL at 07:43

## 2023-11-15 ASSESSMENT — ACTIVITIES OF DAILY LIVING (ADL)
ADLS_ACUITY_SCORE: 26
IADL_COMMENTS: SPOUSE TO COMPLETE AS NEEDED
ADLS_ACUITY_SCORE: 26

## 2023-11-15 NOTE — DISCHARGE SUMMARY
[facility]  Discharge Summary            Interval History:     73 year old female admitted postoperatively for elective Right total knee arthroplasty  with Dr. Tom Stauffer due to DJD unresponsive to non operative treatment.  There were no notable intraoperative complications.  Patient being discharged post op Day #1.  Deepali Crawford received skilled nursing, PT, OT, SW inquiry.  There was no Hospitalist care during the stay.     Deepali Crawford has progressed satisfactorily with ambulation and pain management and without medical complication.  Patient is confident in their discharge and has  met goals with PT and OT. Pt lives at home with spouse and will be discharged to home based on home living conditions and level of support.  Deepali Crawford leaves the hospital in stable condition with good chance for recovery.  Today: doing well.  Eating, ambulating, resting, voiding. No concerns.    Pain: tolerable.   Nausea: none.  Light headedness : none  Chest pain: none  Shortness of breath: none              Assessment and Plan:     S/P R TkA Day #1.  Final Diagnosis: same.  VSS, Hemodynamically asymptomatic, pain management adequate.    PLAN:  DVT prophylaxis with daily aspirin, as patient has no history of VTE.  Pain management with oxycodone, tylenol, Ibuprofen.  Bowel regimen.  OP PT.  RICE  Assistive devices.  Patient instructions attached to discharge.      Discharge to home.  Follow up in clinic as previously scheduled, approx 10-14 days post op.    [facility]  [address] [place]  [address]  [phone]  [phone] fax  [phone] for scheduling                      Physical Exam:   Patient Vitals for the past 12 hrs:   BP Temp Temp src Pulse Resp SpO2   11/15/23 0727 136/65 -- -- 83 18 100 %   11/15/23 0515 116/63 97.3  F (36.3  C) Temporal 70 18 99 %   11/15/23 0146 123/62 97  F (36.1  C) Temporal 61 16 99 %     Hemoglobin   Date Value Ref Range Status    11/15/2023 11.5 (L) 11.7 - 15.7 g/dL Final   10/27/2023 14.4 11.7 - 15.7 g/dL Final       Patient is alert and oriented.  Vitals: stable  Neurovascular status: Grossly intact to RLE, SLR able.  Dressing: clean and dry  Calves: soft and non tender          Cm Jay PA-C  [facility] - Surgery    11/15/2023

## 2023-11-15 NOTE — PLAN OF CARE
Patient vital signs are at baseline: Yes  Patient able to ambulate as they were prior to admission or with assist devices provided by therapies during their stay:  Yes, AX1/W Gait belt and Walker.  Patient MUST void prior to discharge:  Yes, voided x3 this shift..  Patient able to tolerate oral intake:  Yes. No n/v.  Pain has adequate pain control using Oral analgesics:  Yes, tylenol, oxycodone and IV Toradol.   Does patient have an identified :  Yes, Spouse.  Has goal D/C date and time been discussed with patient: Yes,  Reason: Plan is to discharge home.  Goal Outcome Evaluation:

## 2023-11-15 NOTE — PLAN OF CARE
Goal Outcome Evaluation:                  Pt read the discharge instructions and got all questions answered. Acknowledged she understood everything. Took all belongings with them home along with her take home meds.

## 2023-11-15 NOTE — PROGRESS NOTES
[facility]  OUTPATIENT OCCUPATIONAL THERAPY  EVALUATION  PLAN OF TREATMENT FOR OUTPATIENT REHABILITATION  (COMPLETE FOR INITIAL CLAIMS ONLY)  Patient's Last Name, First Name, M.I.  YOB: 1950  May,Deepali BOWSER                          Provider's Name  [facility] Medical Record No.  [id number]                             Onset Date:  11/14/23   Start of Care Date:  11/15/23   Type:     ___PT   _X_OT   ___SLP Medical Diagnosis:  R TKA                    OT Diagnosis:  decreased ADL/IADls Visits from SOC:  1     See note for plan of treatment, functional goals and certification details    I CERTIFY THE NEED FOR THESE SERVICES FURNISHED UNDER        THIS PLAN OF TREATMENT AND WHILE UNDER MY CARE     (Physician co-signature of this document indicates review and certification of the therapy plan).                                11/15/23 0821   Appointment Info   Signing Clinician's Name / Credentials (OT) Charisma Lozano OTR/L   Quick Adds   Quick Adds Certification   Living Environment   People in Home spouse   Current Living Arrangements house   Home Accessibility stairs within home;stairs to enter home   Number of Stairs, Main Entrance 2   Transportation Anticipated family or friend will provide   Living Environment Comments 2 MOSES then all needs met. Tub shower with shower chair and grab bars. Raised toilet seat.   Self-Care   Usual Activity Tolerance good   Regular Exercise No   Equipment Currently Used at Home grab bar, tub/shower;grab bar, toilet;shower chair   Fall history within last six months no   Activity/Exercise/Self-Care Comment IND at baseline. Borrowing a holly.   Instrumental Activities of Daily Living (IADL)   IADL Comments spouse to complete as needed   General Information   Onset of Illness/Injury or Date of Surgery 11/14/23   Referring Physician Tom Stauffer   Patient/Family Therapy Goal Statement (OT) to return home    Additional Occupational Profile Info/Pertinent History of Current Problem Patient is POD #1 for R TKA   Existing Precautions/Restrictions fall   Right Lower Extremity (Weight-bearing Status) weight-bearing as tolerated (WBAT)   General Observations and Info patient was in bed and spouse present   Cognitive Status Examination   Orientation Status orientation to person, place and time   Visual Perception   Visual Impairment/Limitations corrective lenses for reading   Pain Assessment   Patient Currently in Pain Yes, see Vital Sign flowsheet  (rating pain 2-3/10)   Posture   Posture not impaired   Range of Motion Comprehensive   General Range of Motion bilateral upper extremity ROM WNL   Strength Comprehensive (MMT)   General Manual Muscle Testing (MMT) Assessment no strength deficits identified  (in B UE)   Comment, General Manual Muscle Testing (MMT) Assessment decreased strength in R LE to be expected   Muscle Tone Assessment   Muscle Tone Quick Adds No deficits were identified   Coordination   Upper Extremity Coordination No deficits were identified   Bed Mobility   Bed Mobility No deficits identified   Transfers   Transfers shower transfer;toilet transfer   Transfer Comments SBA, fww sit<>stand   Shower Transfer   Type (Shower Transfer)   (tub)   [place] Level (Shower Transfer) contact guard   Toilet Transfer   Toilet Transfer Comments pt reports she has been completing toilet transfer with RN staff, demonstrates skills needed to complete, pt declined offer to perform with OT   Balance   Balance Comments LOB was not noted, general unsteadiness to be expected   Activities of Daily Living   BADL Assessment/Intervention lower body dressing   Lower Body Dressing Assessment/Training   [place] Level (Lower Body Dressing) minimum assist (75% patient effort)   Clinical Impression   Criteria for Skilled Therapeutic Interventions Met (OT) Yes, treatment indicated   OT Diagnosis decreased ADL/IADls   OT Problem  List-Impairments impacting ADL activity tolerance impaired;balance;pain;strength;range of motion (ROM)   Assessment of Occupational Performance 5 or more Performance Deficits   Identified Performance Deficits dsg, bathing, functional/community mobility, household chores, driving,errands   Planned Therapy Interventions (OT) ADL retraining;progressive activity/exercise;transfer training   Clinical Decision Making Complexity (OT) problem focused assessment/low complexity   Risk & Benefits of therapy have been explained evaluation/treatment results reviewed;care plan/treatment goals reviewed;risks/benefits reviewed;current/potential barriers reviewed;participants included;patient   OT Total Evaluation Time   OT Eval, Low Complexity Minutes (53911) 8   Therapy Certification   Medical Diagnosis R TKA   Start of Care Date 11/15/23   Certification date from 11/15/23   Certification date to 11/15/23   OT Goals   Therapy Frequency (OT) One time eval and treatment   OT Predicted Duration/Target Date for Goal Attainment 11/15/23   OT Goals Lower Body Dressing;Transfers;OT Goal 1;OT Goal 2   OT: Lower Body Dressing Modified independent;Goal Met   OT: Transfer Modified independent;with assistive device;Goal Met  (tub transfer)   OT: Goal 1 Patient will verbalize at least 2-3 adaptations to ADLs routines at home to accommodate energy level and safety needs.- goal met   OT: Goal 2 Patient will demonstrate safe use of walker during ADLs.- goal met   OT Discharge Planning   OT Plan dc   OT Rationale for DC Rec defer to ortho team for dc plan- patient has met needed goals for safe discharge home with family to A as needed with IADL's; household chores, driving, errands, cooking and bathing.  Will discharge from OT services.   OT Brief overview of current status SBA, fww functional transfers, mobility, mod I with LE dressing   Total Session Time   Total Session Time (sum of timed and untimed services) 8

## 2023-11-15 NOTE — PLAN OF CARE
Patient vital signs are at baseline: Yes  Patient able to ambulate as they were prior to admission or with assist devices provided by therapies during their stay:  Yes, AX1/W Gait belt and Walker.  Patient MUST void prior to discharge:  Yes, voiding x3 this shift.  Patient able to tolerate oral intake:  Yes. No c/o n/v.  Pain has adequate pain control using Oral analgesics:  Yes, tylenol, oxycodone and IV Toradol.   Does patient have an identified :  Yes, Spouse.  Has goal D/C date and time been discussed with patient: Yes, Plan is to discharge home.  Goal Outcome Evaluation:

## 2023-11-15 NOTE — PROGRESS NOTES
Occupational Therapy Discharge Summary    Reason for therapy discharge:    All goals and outcomes met, no further needs identified.    Progress towards therapy goal(s). See goals on Care Plan in [facility] electronic health record for goal details.  Goals met    Therapy recommendation(s):    No further therapy is recommended.

## 2023-11-15 NOTE — PLAN OF CARE
Patient vital signs are at baseline: Yes, on 1 lpm nasal cannula with capno monitoring.  Patient able to ambulate as they were prior to admission or with assist devices provided by therapies during their stay:  Yes, assist of 1 using gait belt and walker.  Patient MUST void prior to discharge:  Yes, voiding.  Patient able to tolerate oral intake:  Yes, tolerated diet  Pain has adequate pain control using Oral analgesics:  Yes, pain controlled with tylenol, oxycodone and iv tordol.  Does patient have an identified :  Yes, spouse.  Has goal D/C date and time been discussed with patient:  Yes, plan to discharge to home.  Goal Outcome Evaluation:

## 2023-11-15 NOTE — PLAN OF CARE
Physical Therapy Discharge Summary    Reason for therapy discharge:    All goals and outcomes met, no further needs identified.    Progress towards therapy goal(s). See goals on Care Plan in [facility] electronic health record for goal details.  Goals met    Therapy recommendation(s):    Defer to ortho

## 2023-11-17 ENCOUNTER — MYC MEDICAL ADVICE (OUTPATIENT)
Dept: ORTHOPEDICS | Facility: CLINIC | Age: 73
End: 2023-11-17
Payer: COMMERCIAL

## 2023-11-17 DIAGNOSIS — M17.11 PRIMARY OSTEOARTHRITIS OF RIGHT KNEE: ICD-10-CM

## 2023-11-21 ENCOUNTER — THERAPY VISIT (OUTPATIENT)
Dept: PHYSICAL THERAPY | Facility: CLINIC | Age: 73
End: 2023-11-21
Attending: STUDENT IN AN ORGANIZED HEALTH CARE EDUCATION/TRAINING PROGRAM
Payer: COMMERCIAL

## 2023-11-21 DIAGNOSIS — Z47.1 AFTERCARE FOLLOWING RIGHT KNEE JOINT REPLACEMENT SURGERY: ICD-10-CM

## 2023-11-21 DIAGNOSIS — Z96.651 AFTERCARE FOLLOWING RIGHT KNEE JOINT REPLACEMENT SURGERY: ICD-10-CM

## 2023-11-21 DIAGNOSIS — Z96.651 STATUS POST TOTAL RIGHT KNEE REPLACEMENT: ICD-10-CM

## 2023-11-21 DIAGNOSIS — M17.11 PRIMARY OSTEOARTHRITIS OF RIGHT KNEE: Primary | ICD-10-CM

## 2023-11-21 PROCEDURE — 97110 THERAPEUTIC EXERCISES: CPT | Mod: GP | Performed by: PHYSICAL THERAPIST

## 2023-11-21 PROCEDURE — 97161 PT EVAL LOW COMPLEX 20 MIN: CPT | Mod: GP | Performed by: PHYSICAL THERAPIST

## 2023-11-21 PROCEDURE — 97530 THERAPEUTIC ACTIVITIES: CPT | Mod: GP | Performed by: PHYSICAL THERAPIST

## 2023-11-21 RX ORDER — OXYCODONE HYDROCHLORIDE 5 MG/1
5 TABLET ORAL EVERY 4 HOURS PRN
Qty: 26 TABLET | Refills: 0 | Status: SHIPPED | OUTPATIENT
Start: 2023-11-21

## 2023-11-21 ASSESSMENT — ACTIVITIES OF DAILY LIVING (ADL)
RISE FROM A CHAIR: ACTIVITY IS SOMEWHAT DIFFICULT
SQUAT: I AM UNABLE TO DO THE ACTIVITY
KNEE_ACTIVITY_OF_DAILY_LIVING_SCORE: 30
KNEEL ON THE FRONT OF YOUR KNEE: I AM UNABLE TO DO THE ACTIVITY
WALK: ACTIVITY IS VERY DIFFICULT
HOW_WOULD_YOU_RATE_THE_CURRENT_FUNCTION_OF_YOUR_KNEE_DURING_YOUR_USUAL_DAILY_ACTIVITIES_ON_A_SCALE_FROM_0_TO_100_WITH_100_BEING_YOUR_LEVEL_OF_KNEE_FUNCTION_PRIOR_TO_YOUR_INJURY_AND_0_BEING_THE_INABILITY_TO_PERFORM_ANY_OF_YOUR_USUAL_DAILY_ACTIVITIES?: 10
LIMPING: THE SYMPTOM AFFECTS MY ACTIVITY SEVERELY
STIFFNESS: THE SYMPTOM AFFECTS MY ACTIVITY SEVERELY
AS_A_RESULT_OF_YOUR_KNEE_INJURY,_HOW_WOULD_YOU_RATE_YOUR_CURRENT_LEVEL_OF_DAILY_ACTIVITY?: SEVERELY ABNORMAL
WEAKNESS: THE SYMPTOM AFFECTS MY ACTIVITY SEVERELY
GO UP STAIRS: ACTIVITY IS SOMEWHAT DIFFICULT
SWELLING: THE SYMPTOM AFFECTS MY ACTIVITY SEVERELY
STAND: ACTIVITY IS MINIMALLY DIFFICULT
GO DOWN STAIRS: ACTIVITY IS SOMEWHAT DIFFICULT
GIVING WAY, BUCKLING OR SHIFTING OF KNEE: THE SYMPTOM AFFECTS MY ACTIVITY MODERATELY
HOW_WOULD_YOU_RATE_THE_OVERALL_FUNCTION_OF_YOUR_KNEE_DURING_YOUR_USUAL_DAILY_ACTIVITIES?: SEVERELY ABNORMAL
KNEE_ACTIVITY_OF_DAILY_LIVING_SUM: 21
PAIN: THE SYMPTOM AFFECTS MY ACTIVITY SEVERELY
RAW_SCORE: 21
SIT WITH YOUR KNEE BENT: I AM UNABLE TO DO THE ACTIVITY

## 2023-11-21 NOTE — TELEPHONE ENCOUNTER
Phoned patient back regarding Oxycodone refill request.    Patient is s/p right total knee arthroplasty DOS 11/14/23 with Dr. Stauffer.    Patient reports the Oxycodone is helpful for her pain relief. She typically only takes 1 tab or 0.5 tab of a 5mg tab.  Writer advised further weaning once pain is better tolerated. She reports her first PT appt is today. Writer advised taking pain relievers about 30min-1hr prior to appt so she can be an active participant in her therapy.  Advised RICE and OTC analgesics along with Oxycodone to help with pain/swelling management.  Patient relayed good understanding of recommendations.    Medication routed to provider for signature.    Angelita Razo RN on 11/21/2023 at 10:27 AM

## 2023-11-21 NOTE — PROGRESS NOTES
PHYSICAL THERAPY EVALUATION  Type of Visit: Evaluation    See electronic medical record for Abuse and Falls Screening details.    Subjective       Presenting condition or subjective complaint: knee replacement  Date of onset: 11/14/23    Relevant medical history:     Dates & types of surgery:      Prior diagnostic imaging/testing results:       Prior therapy history for the same diagnosis, illness or injury: Yes just before surgery    Living Environment  Social support: With a significant other or spouse   Type of home: House; 2-story   Stairs to enter the home: No 2 Is there a railing: No   Ramp:     Stairs inside the home: Yes 12 Is there a railing: Yes   Help at home: None  Equipment owned: Straight Cane; Walker; Grab bars; Raised toilet seat; Bath bench     Employment: No retired  Hobbies/Interests: water color painting, hiking    Patient goals for therapy: walk further without pain    Pain assessment: Pain present  See objective evaluation for additional pain details     Objective     KNEE EVALUATION  PAIN: Pain Location: calf and thigh pain on RLE, when she tries lifting her leg she feels a sharp pain in top of the knee  Pain Quality: dull ache, intense  Pain is Exacerbated By: she can only get to a certain point with the bending   Pain is Relieved By: pain meds help, icing and elevating   Pain Progression: gotten a little better with a little more movement each day    GAIT:   Assistive Device(s): SPC  Gait Deviations: Antalgic  Limp on RLE due to decreased knee flexion and extension    ROM:   (Degrees) Left AROM Left PROM  Right AROM Right PROM   Knee Flexion   67    Knee Extension   ~5 deg from 0      Strength:   Pain: - none + mild ++ moderate +++ severe  Strength Scale: 0-5/5 Left Right   Knee Flexion  Not tested   Knee Extension  Unable to reach end range   Quad Set  Limited due to limited range of motion     FLEXIBILITY: decreased of hamstrings, gastrocs, HF  PALPATION: NT today  JOINT MOBILITY:  decreased of RLE due to stiffness, swelling and s/p surgery      Assessment & Plan   CLINICAL IMPRESSIONS  Medical Diagnosis: s/p R TKA    Treatment Diagnosis: decreased R knee range of motion, impaired gait   Impression/Assessment: Patient is a 73 year old female with R knee pain complaints.  The following significant findings have been identified: Pain, Decreased ROM/flexibility, Decreased joint mobility, Decreased strength, Impaired gait, and Decreased activity tolerance. These impairments interfere with their ability to perform self care tasks, recreational activities, household chores, household mobility, and community mobility as compared to previous level of function.     Clinical Decision Making (Complexity):  Clinical Presentation: Stable/Uncomplicated  Clinical Presentation Rationale: based on medical and personal factors listed in PT evaluation  Clinical Decision Making (Complexity): Low complexity    PLAN OF CARE  Treatment Interventions:  Modalities: Cryotherapy, E-stim, Ultrasound  Interventions: Gait Training, Manual Therapy, Neuromuscular Re-education, Therapeutic Activity, Therapeutic Exercise, Self-Care/Home Management    Long Term Goals     PT Goal 1  Goal Identifier: Ambulation  Goal Description: Patient will be able to walk at least 1 mile without need for SPC or visible gait impairments in order to function in household chores and return to exercise  Target Date: 02/03/24  PT Goal 2  Goal Identifier: Knee AROM  Goal Description: Patient will be able to demonstrate full R knee AROM from 0-120 without swelling, restriction or pain in order to return to function and produce a normal gait pattern  Goal Progress: Baseline: 5-0-67      Frequency of Treatment: 1x/week for 4 weeks, 2x/month for 2 months, then PRN  Duration of Treatment: 75 days    Education Assessment:        Risks and benefits of evaluation/treatment have been explained.   Patient/Family/caregiver agrees with Plan of Care.      Evaluation Time:     PT Eval, Low Complexity Minutes (46729): 15     Signing Clinician: RALEIGH KOCH, PT      [facility]                                                                                   OUTPATIENT PHYSICAL THERAPY      PLAN OF TREATMENT FOR OUTPATIENT REHABILITATION   Patient's Last Name, First Name, Deepali Smith YOB: 1950   Provider's Name   [facility]   Medical Record No.  [id number]     Onset Date: 11/14/23  Start of Care Date: 11/21/23     Medical Diagnosis:  s/p R TKA      PT Treatment Diagnosis:  decreased R knee range of motion, impaired gait Plan of Treatment  Frequency/Duration: 1x/week for 4 weeks, 2x/month for 2 months, then PRN/ 75 days    Certification date from 11/21/23 to 02/03/24         See note for plan of treatment details and functional goals     RALEIGH KOCH, PT                         I CERTIFY THE NEED FOR THESE SERVICES FURNISHED UNDER        THIS PLAN OF TREATMENT AND WHILE UNDER MY CARE     (Physician attestation of this document indicates review and certification of the therapy plan).              Referring Provider:  Tom Stauffer    Initial Assessment  See Epic Evaluation- Start of Care Date: 11/21/23

## 2023-11-21 NOTE — TELEPHONE ENCOUNTER
[facility] Call Center    Phone Message    May a detailed message be left on voicemail: yes     Reason for Call: Medication Refill Request    Has the patient contacted the pharmacy for the refill? Yes   Name of medication being requested: oxycodone  Provider who prescribed the medication: Dr. Stauffer  Pharmacy: [facility] in [place] on [address]  Date medication is needed: as soon as possible     Action Taken: Message routed to:  Other: [facility]    Travel Screening: Not Applicable

## 2023-11-27 ENCOUNTER — THERAPY VISIT (OUTPATIENT)
Dept: PHYSICAL THERAPY | Facility: CLINIC | Age: 73
End: 2023-11-27
Attending: STUDENT IN AN ORGANIZED HEALTH CARE EDUCATION/TRAINING PROGRAM
Payer: COMMERCIAL

## 2023-11-27 DIAGNOSIS — M25.561 CHRONIC PAIN OF RIGHT KNEE: ICD-10-CM

## 2023-11-27 DIAGNOSIS — G89.29 CHRONIC PAIN OF RIGHT KNEE: ICD-10-CM

## 2023-11-27 DIAGNOSIS — Z96.651 STATUS POST TOTAL RIGHT KNEE REPLACEMENT: Primary | ICD-10-CM

## 2023-11-27 DIAGNOSIS — M17.11 PRIMARY OSTEOARTHRITIS OF RIGHT KNEE: ICD-10-CM

## 2023-11-27 PROCEDURE — 97140 MANUAL THERAPY 1/> REGIONS: CPT | Mod: GP | Performed by: PHYSICAL THERAPIST

## 2023-11-27 PROCEDURE — 97110 THERAPEUTIC EXERCISES: CPT | Mod: GP | Performed by: PHYSICAL THERAPIST

## 2023-12-04 ENCOUNTER — THERAPY VISIT (OUTPATIENT)
Dept: PHYSICAL THERAPY | Facility: CLINIC | Age: 73
End: 2023-12-04
Attending: STUDENT IN AN ORGANIZED HEALTH CARE EDUCATION/TRAINING PROGRAM
Payer: COMMERCIAL

## 2023-12-04 DIAGNOSIS — Z96.651 STATUS POST TOTAL RIGHT KNEE REPLACEMENT: Primary | ICD-10-CM

## 2023-12-04 PROCEDURE — 97110 THERAPEUTIC EXERCISES: CPT | Mod: GP | Performed by: PHYSICAL THERAPIST

## 2023-12-04 PROCEDURE — 97530 THERAPEUTIC ACTIVITIES: CPT | Mod: GP | Performed by: PHYSICAL THERAPIST

## 2023-12-08 ENCOUNTER — HOSPITAL ENCOUNTER (OUTPATIENT)
Dept: ULTRASOUND IMAGING | Facility: CLINIC | Age: 73
Discharge: HOME | End: 2023-12-08
Attending: PHYSICIAN ASSISTANT | Admitting: PHYSICIAN ASSISTANT
Payer: COMMERCIAL

## 2023-12-08 ENCOUNTER — OFFICE VISIT (OUTPATIENT)
Dept: ORTHOPEDICS | Facility: CLINIC | Age: 73
End: 2023-12-08
Payer: COMMERCIAL

## 2023-12-08 VITALS
HEIGHT: 64 IN | DIASTOLIC BLOOD PRESSURE: 86 MMHG | WEIGHT: 169 LBS | SYSTOLIC BLOOD PRESSURE: 141 MMHG | BODY MASS INDEX: 28.85 KG/M2

## 2023-12-08 DIAGNOSIS — Z96.651 STATUS POST TOTAL RIGHT KNEE REPLACEMENT: ICD-10-CM

## 2023-12-08 DIAGNOSIS — M79.661 RIGHT CALF PAIN: ICD-10-CM

## 2023-12-08 DIAGNOSIS — M79.661 RIGHT CALF PAIN: Primary | ICD-10-CM

## 2023-12-08 PROCEDURE — 93971 EXTREMITY STUDY: CPT | Mod: RT

## 2023-12-08 PROCEDURE — 99024 POSTOP FOLLOW-UP VISIT: CPT | Performed by: PHYSICIAN ASSISTANT
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
## 2023-12-13 ENCOUNTER — THERAPY VISIT (OUTPATIENT)
Dept: PHYSICAL THERAPY | Facility: CLINIC | Age: 73
End: 2023-12-13
Payer: COMMERCIAL

## 2023-12-13 DIAGNOSIS — Z96.651 STATUS POST TOTAL RIGHT KNEE REPLACEMENT: Primary | ICD-10-CM

## 2023-12-13 PROCEDURE — 97110 THERAPEUTIC EXERCISES: CPT | Mod: GP | Performed by: PHYSICAL THERAPIST

## 2023-12-13 PROCEDURE — 97140 MANUAL THERAPY 1/> REGIONS: CPT | Mod: GP | Performed by: PHYSICAL THERAPIST

## 2023-12-29 NOTE — PROGRESS NOTES
[facility]  Orthopaedic Surgery Consultation by Tom Stauffer M.D.    Deepali Crawford MRN# [id number]   Age: 73 year old YOB: 1950     Requesting physician: Albert Yeo No Ref-Primary, Physician     Background history:  DX:  None    TREATMENTS:  Right knee surgery at age 19 most likely patellar fracture/patellar tendon rupture.  11/14/23, right total knee arthroplasty, Angela Rodriguez           History of Present Illness:   73-year-old female with history of chronic right knee pain due to end-stage osteophytic changes who was insufficiently responding to nonsurgical treatment options therefore underwent a right total knee arthroplasty on 11/14/2023.  Recovering appropriately.    Patient presents today now about 6 weeks postoperatively.  She was last seen by Daljit Singh PA-C, on 12/8/23. She has attended 5 session of physical therapy and has been working on home exercise program. She feels things are going well and she continues to see improvement. She reports her physical therapist recommended 2 more sessions, but the patient does not feel these are needed. After her last visit an ultrasound was completed to rule out DVT. Since that time the cramping in her calf has lessened. She states she is sleeping better and has improvement each week. She reports still having pain and swelling, but swelling is going down. She no longer needs a cane to walk.  The incision is healing well.  No signs of infection.  She is happy with the result so far.    Social:   Occupation: retired   Living situation:   Hobbies / Sports: hiking, paining    Smoking: No  Alcohol: No  Illicit drug use: No         Physical Exam:     EXAMINATION pertinent findings:   PSYCH: Pleasant, healthy-appearing, alert, oriented x3, cooperative. Normal mood and affect.  VITAL SIGNS: There were no vitals taken for this visit.  Reviewed nursing intake notes.   There is no height or weight on file to calculate  BMI.  RESP: non labored breathing   ABD: benign, soft, non-tender, no acute peritoneal findings  SKIN: grossly normal   LYMPHATIC: grossly normal, no adenopathy, no extremity edema  NEURO: grossly normal , no motor deficits  VASCULAR: satisfactory perfusion of all extremities   MUSCULOSKELETAL:   Alignment: Neutral  Gait: Slight antalgic gait over right lower extremity.    R knee: The incision is clean, dry, and intact with no erythema, dehiscence, or discharge. -0-0  .   Straight leg raise +. No redness, warmth or skin changes present.  Postsurgical effusion present.. Ligamentously stable in both ML and AP direction. Normal PF tracking.      Right LE:   Thigh and leg compartments soft and compressible   +Quad/TA/GSC/FHL/EHL   SILT DP/SP/Pb/Saph/Tib nerve distributions   Palpable dorsalis pedis pulse          Data:   All laboratory data reviewed  All imaging studies reviewed by me personally.    XR knee 1/3/24:  My interpretation: Status post placement of right total knee arthroplasty.  Adequate sizing, orientation and fixation of components.  No signs of complications.             Assessment and Plan:   Assessment:  73-year-old female with history of chronic right knee pain due to end-stage osteophytic changes who was insufficiently responding to nonsurgical treatment options therefore underwent a right total knee arthroplasty on 11/14/2023.  Recovering appropriately.     Plan:  I extensively discussed my findings with the patient.  Patient appears to be recovering appropriately.  Patient will continue to work with physical therapy on range of motion, strengthening and gait training.  Patient has completed the DVT prophylaxis until 4 weeks postoperatively.  All questions were answered.  Patient understands and agrees to the treatment plan as set forth.  We will follow-up with patient at the 1 year postoperative date with renewed radiographic imaging studies.      Tom Stauffer MD, PhD    Assistant  Professor Adult Reconstruction  [facility] Department of Orthopaedic Surgery        This note was created using dictation software and may contain errors.  Please contact the creator for any clarifications that are needed.

## 2024-01-03 ENCOUNTER — OFFICE VISIT (OUTPATIENT)
Dept: ORTHOPEDICS | Facility: CLINIC | Age: 74
End: 2024-01-03
Payer: COMMERCIAL

## 2024-01-03 ENCOUNTER — ANCILLARY PROCEDURE (OUTPATIENT)
Dept: GENERAL RADIOLOGY | Facility: CLINIC | Age: 74
End: 2024-01-03
Attending: STUDENT IN AN ORGANIZED HEALTH CARE EDUCATION/TRAINING PROGRAM
Payer: COMMERCIAL

## 2024-01-03 VITALS — SYSTOLIC BLOOD PRESSURE: 161 MMHG | BODY MASS INDEX: 29.01 KG/M2 | WEIGHT: 169 LBS | DIASTOLIC BLOOD PRESSURE: 87 MMHG

## 2024-01-03 DIAGNOSIS — Z96.651 STATUS POST TOTAL RIGHT KNEE REPLACEMENT: Primary | ICD-10-CM

## 2024-01-03 DIAGNOSIS — Z96.651 STATUS POST TOTAL RIGHT KNEE REPLACEMENT: ICD-10-CM

## 2024-01-03 PROCEDURE — 99024 POSTOP FOLLOW-UP VISIT: CPT | Performed by: STUDENT IN AN ORGANIZED HEALTH CARE EDUCATION/TRAINING PROGRAM

## 2024-01-03 PROCEDURE — 73562 X-RAY EXAM OF KNEE 3: CPT | Mod: TC | Performed by: RADIOLOGY

## 2024-01-03 ASSESSMENT — KOOS JR
HOW SEVERE IS YOUR KNEE STIFFNESS AFTER FIRST WAKING IN MORNING: MODERATE
RISING FROM SITTING: MILD
GOING UP OR DOWN STAIRS: MILD
KOOS JR SCORING: 73.34
TWISING OR PIVOTING ON KNEE: MILD

## 2024-01-03 NOTE — LETTER
1/3/2024         RE: Deepali Crawford  [address]  [facility] [address]        Dear Colleague,    Thank you for referring your patient, Deepali Crawford, to the [facility] [place]. Please see a copy of my visit note below.        [facility]  Orthopaedic Surgery Consultation by Tom Stauffer M.D.    Deepali Crawford MRN# [id number]   Age: 73 year old YOB: 1950     Requesting physician: Albert Yeo No Ref-Primary, Physician     Background history:  DX:  None    TREATMENTS:  Right knee surgery at age 19 most likely patellar fracture/patellar tendon rupture.  11/14/23, right total knee arthroplasty, Angela Rodriguez           History of Present Illness:   73-year-old female with history of chronic right knee pain due to end-stage osteophytic changes who was insufficiently responding to nonsurgical treatment options therefore underwent a right total knee arthroplasty on 11/14/2023.  Recovering appropriately.    Patient presents today now about 6 weeks postoperatively.  She was last seen by Daljit Singh PA-C, on 12/8/23. She has attended 5 session of physical therapy and has been working on home exercise program. She feels things are going well and she continues to see improvement. She reports her physical therapist recommended 2 more sessions, but the patient does not feel these are needed. After her last visit an ultrasound was completed to rule out DVT. Since that time the cramping in her calf has lessened. She states she is sleeping better and has improvement each week. She reports still having pain and swelling, but swelling is going down. She no longer needs a cane to walk.  The incision is healing well.  No signs of infection.  She is happy with the result so far.    Social:   Occupation: retired   Living situation:   Hobbies / Sports: hiking, paining    Smoking: No  Alcohol: No  Illicit drug use: No         Physical Exam:     EXAMINATION  pertinent findings:   PSYCH: Pleasant, healthy-appearing, alert, oriented x3, cooperative. Normal mood and affect.  VITAL SIGNS: There were no vitals taken for this visit.  Reviewed nursing intake notes.   There is no height or weight on file to calculate BMI.  RESP: non labored breathing   ABD: benign, soft, non-tender, no acute peritoneal findings  SKIN: grossly normal   LYMPHATIC: grossly normal, no adenopathy, no extremity edema  NEURO: grossly normal , no motor deficits  VASCULAR: satisfactory perfusion of all extremities   MUSCULOSKELETAL:   Alignment: Neutral  Gait: Slight antalgic gait over right lower extremity.    R knee: The incision is clean, dry, and intact with no erythema, dehiscence, or discharge. -0-0  .   Straight leg raise +. No redness, warmth or skin changes present.  Postsurgical effusion present.. Ligamentously stable in both ML and AP direction. Normal PF tracking.      Right LE:   Thigh and leg compartments soft and compressible   +Quad/TA/GSC/FHL/EHL   SILT DP/SP/Pb/Saph/Tib nerve distributions   Palpable dorsalis pedis pulse          Data:   All laboratory data reviewed  All imaging studies reviewed by me personally.    XR knee 1/3/24:  My interpretation: Status post placement of right total knee arthroplasty.  Adequate sizing, orientation and fixation of components.  No signs of complications.             Assessment and Plan:   Assessment:  73-year-old female with history of chronic right knee pain due to end-stage osteophytic changes who was insufficiently responding to nonsurgical treatment options therefore underwent a right total knee arthroplasty on 11/14/2023.  Recovering appropriately.     Plan:  I extensively discussed my findings with the patient.  Patient appears to be recovering appropriately.  Patient will continue to work with physical therapy on range of motion, strengthening and gait training.  Patient has completed the DVT prophylaxis until 4 weeks postoperatively.   All questions were answered.  Patient understands and agrees to the treatment plan as set forth.  We will follow-up with patient at the 1 year postoperative date with renewed radiographic imaging studies.      Tom Stafufer MD, PhD     Adult Reconstruction  [facility] Department of Orthopaedic Surgery        This note was created using dictation software and may contain errors.  Please contact the creator for any clarifications that are needed.                Again, thank you for allowing me to participate in the care of your patient.        Sincerely,        Tom Stauffer MD

## 2024-07-08 PROBLEM — G89.29 CHRONIC PAIN OF RIGHT KNEE: Status: RESOLVED | Noted: 2023-06-06 | Resolved: 2024-07-08

## 2024-07-08 PROBLEM — M25.561 CHRONIC PAIN OF RIGHT KNEE: Status: RESOLVED | Noted: 2023-06-06 | Resolved: 2024-07-08

## 2024-07-08 NOTE — PROGRESS NOTES
12/13/23 0500   Appointment Info   Signing clinician's name / credentials Linnette Maria E PT [facility]   Total/Authorized Visits 12 E&T   Visits Used 4   Medical Diagnosis s/p R TKA   PT Tx Diagnosis decreased R knee range of motion, impaired gait   Quick Adds Certification   Progress Note/Certification   Start of Care Date 11/21/23   Onset of illness/injury or Date of Surgery 11/14/23   Therapy Frequency 1x/week for 4 weeks, 2x/month for 2 months, then PRN   Predicted Duration 75 days   Certification date from 11/21/23   Certification date to 02/03/24   GOALS   PT Goals 2   PT Goal 1   Goal Identifier Ambulation   Goal Description Patient will be able to walk at least 1 mile without need for SPC or visible gait impairments in order to function in household chores and return to exercise   Goal Progress walking up to 1 block with cane, walks in home and into clinic today without cane   Target Date 02/03/24   PT Goal 2   Goal Identifier Knee AROM   Goal Description Patient will be able to demonstrate full R knee AROM from 0-120 without swelling, restriction or pain in order to return to function and produce a normal gait pattern   Goal Progress 0-0-120   Target Date 02/03/24   Date Met 12/13/23   Subjective Report   Subjective Report had more knee pain and swelling on sunday, perhaps related to overdoing HEP on saturday.  Saw surgeon last week who said she was ahead of the game.  Had US due to calf cramping, was negative   Objective Measures   Objective Measures Objective Measure 1;Objective Measure 2;Objective Measure 3;Objective Measure 4   Objective Measure 1   Objective Measure R knee AROM   Details 0-0-120   Objective Measure 2   Objective Measure gait   Details presents today without cane, with antalgic gait.  Improves with cueing for increased stance time on right, and level pelvis   Objective Measure 3   Objective Measure strength   Details able to go sit to stand 5x without arms, requires cueing for WB through R  LE   Objective Measure 4   Objective Measure other   Details mild scar adhesion brayan distally   Treatment Interventions (PT)   Interventions Therapeutic Procedure/Exercise;Manual Therapy;Therapeutic Activity   Therapeutic Procedure/Exercise   Therapeutic Procedures: strength, endurance, ROM, flexibility minutes (45699) 30   Therapeutic Procedures Ther Proc 2;Ther Proc 3   Ther Proc 1 Hospital HEP   Ther Proc 1 - Details quad set, ankle pumps, SAQ, heel slides   Ther Proc 2 Upright bike   Ther Proc 2 - Details SH 6, 5 min for revolutions and AROM   Ther Proc 3 LEG PRESS   Ther Proc 3 - Details sled set at 2, double leg,  3 pl 20x   PTRx Ther Proc 1 Sit to Stand   PTRx Ther Proc 1 - Details feet even or left foot slightly [id number] working up to 10 as tolerated   PTRx Ther Proc 2 Sidestep   PTRx Ther Proc 2 - Details 3-4 counter lapscueing for level pelvis   PTRx Ther Proc 3 Standing Soleus Stretch   PTRx Ther Proc 3 - Details 3 reps twice a day with 15 sec hold   PTRx Ther Proc 4 Standing Gastroc Stretch   PTRx Ther Proc 4 - Details 3 reps twice a day with 15 sec hold   PTRx Ther Proc 5 Toe Raises   PTRx Ther Proc 5 - Details 10x double leg   PTRx Ther Proc 6 Hip AROM Standing Abduction   PTRx Ther Proc 6 - Details 10x each leg with attention to level pelvis   PTRx Ther Proc 7 Hip Flexion Straight Leg Raise   PTRx Ther Proc 7 - Details 10x independently   PTRx Ther Proc 8 Knee Bends   PTRx Ther Proc 8 - Details 10x at kitchen sink - tolerated well   PTRx Ther Proc 9 Passive Knee Extension Stretch   PTRx Ther Proc 9 - Details up to 5 minutes   PTRx Ther Proc 10 Supine Heel Slides   PTRx Ther Proc 10 - Details 10 reps hold 5 at top if needed   PTRx Ther Proc 11 Isometric Quad   PTRx Ther Proc 11 - Details hold 5 sec x 10   PTRx Ther Proc 12 Short Arc Knee Extension (Long Sitting)   PTRx Ther Proc 12 - Details hold 5 sec x 10   Skilled Intervention cueing for proper form   Patient Response/Progress improving right knee  ROM   PTRx Ther Proc 13 Seated Heel Slide with Foot on the Floor   PTRx Ther Proc 13 - Details hold 10 sec x 5   PTRx Ther Proc 14 Stair Knee Flexion Stretch   PTRx Ther Proc 14 - Details hold 5 sec x 5-10 reps   Therapeutic Activity   Ther Act 1 EDU   Ther Act 1 - Details instructed in desensitiation left knee by gently rubbing, letting sheets/fabric touch knee.  also instructed in scar massage   PTRx Ther Act 1 Education Sheet General   PTRx Ther Act 1 - Details No Notes   Neuromuscular Re-education   PTRx Neuro Re-ed 1 Balance Single Leg Stance Supported and Unsupported   PTRx Neuro Re-ed 1 - Details work on for 20 sec at a time concentrate on level pelvis   Manual Therapy   Manual Therapy: Mobilization, MFR, MLD, friction massage minutes (68742) 10   Manual Therapy 1 scap massage   Skilled Intervention for decreased pain and increased knee flexion ROM   Patient Response/Progress tolerated well   Manual Therapy 2 gentle tibiofemoral distraction with flexion ROM OP seated   Manual Therapy 2 - Details 5 min   Manual Therapy Manual Therapy 2   Manual Therapy 1 - Details 5 min   Plan   Updates to plan of care 1-2x/week per availablility and patient motivation at home   Plan for next session step ex?   Total Session Time   Timed Code Treatment Minutes 40   Total Treatment Time (sum of timed and untimed services) 40       DISCHARGE  Reason for Discharge: Patient has failed to schedule further appointments.    Equipment Issued: none    Discharge Plan: Patient to continue home program.    Referring Provider:  Tom Stauffer

## 2024-07-28 ENCOUNTER — HEALTH MAINTENANCE LETTER (OUTPATIENT)
Age: 74
End: 2024-07-28

## 2024-11-01 ENCOUNTER — TELEPHONE (OUTPATIENT)
Dept: ORTHOPEDICS | Facility: CLINIC | Age: 74
End: 2024-11-01
Payer: COMMERCIAL

## 2024-11-01 NOTE — TELEPHONE ENCOUNTER
[facility] Call Center    Phone Message    May a detailed message be left on voicemail: yes     Reason for Call: Patient had Surgery with Doctor  Nov 2023 for Knee Replacement. She asking for Antibiotics  for soon Dental Appt. Please Contact Patient Thanks.    Action Taken: Message routed to:  Other: [facility] surgical doctors    Travel Screening: Not Applicable     Date of Service:

## 2024-11-04 ENCOUNTER — TELEPHONE (OUTPATIENT)
Dept: ORTHOPEDICS | Facility: CLINIC | Age: 74
End: 2024-11-04
Payer: COMMERCIAL

## 2024-11-04 NOTE — TELEPHONE ENCOUNTER
Phoned patient back regarding dental abx inquiry.    Patient is s/p right total knee arthroplasty DOS 11/14/23 with Dr. Stauffer.    Patient does not have a dental visit scheduled yet but wondering if she needs a dental prophylactic.  Reviewed protocol with patient.  She does not meet high risk criteria for further prescribing once she is 1 year status post arthroplasty.  Patient was thankful for callback and explanation.    Angelita Razo RN on 11/4/2024 at 4:15 PM

## 2024-11-04 NOTE — TELEPHONE ENCOUNTER
Attempted to call patient.  No answer during call, VM left with callback instructions to review dental prophylaxis guidelines.    Angelita Razo RN on 11/4/2024 at 10:45 AM

## 2024-11-04 NOTE — TELEPHONE ENCOUNTER
Other: Patient got a VM from Angelita, she works with Dr. Stauffer. Patient would like a call back.     Could we send this information to you in [employer] or would you prefer to receive a phone call?:   Patient would prefer a phone call   Okay to leave a detailed message?: Yes at Cell number on file:    Telephone Information:   Mobile [phone]

## 2025-06-08 ENCOUNTER — HEALTH MAINTENANCE LETTER (OUTPATIENT)
Age: 75
End: 2025-06-08

## 2025-08-10 ENCOUNTER — HEALTH MAINTENANCE LETTER (OUTPATIENT)
Age: 75
End: 2025-08-10

## (undated) DEVICE — LINEN ORTHO ACL PACK 5447

## (undated) DEVICE — SET HANDPIECE INTERPULSE W/COAXIAL FAN SPRAY TIP 0210118000

## (undated) DEVICE — DRAPE MAYO STAND 23X54 8337

## (undated) DEVICE — BAG CLEAR TRASH 1.3M 39X33" P4040C

## (undated) DEVICE — BLADE SAW SAGITTAL STRK 13X90X1.27MM HD SYS 6 6113-127-090

## (undated) DEVICE — SPONGE LAP 18X18" X8435

## (undated) DEVICE — DRSG STERI STRIP 1/2X4" R1547

## (undated) DEVICE — TOURNIQUET SGL  BLADDER 30"X4" BLUE 5921030135

## (undated) DEVICE — BNDG ELASTIC 6" DBL LENGTH UNSTERILE 6611-16

## (undated) DEVICE — LINEN FULL SHEET 5511

## (undated) DEVICE — SU VICRYL 0 MO-4 CR 18" J701D

## (undated) DEVICE — GLOVE BIOGEL PI MICRO INDICATOR UNDERGLOVE SZ 8.0 48980

## (undated) DEVICE — PACK TOTAL KNEE BOXED LATEX FREE PO15TKFCT

## (undated) DEVICE — GLOVE BIOGEL PI ULTRATOUCH SZ 7.5 41175

## (undated) DEVICE — SU VICRYL 2-0 CT-1 27" UND J259H

## (undated) DEVICE — SU MONOCRYL 3-0 PS-1 27" Y936H

## (undated) DEVICE — SOL NACL 0.9% IRRIG 3000ML BAG 2B7477

## (undated) DEVICE — SU VICRYL 1 MO-4 18" J702D

## (undated) DEVICE — DRSG AQUACEL AG HYDROFIBER  3.5X10" 422605

## (undated) DEVICE — Device

## (undated) DEVICE — DRAPE STERI U 1015

## (undated) DEVICE — HOOD FLYTE W/PEELAWAY 408-800-100

## (undated) DEVICE — SUCTION MANIFOLD NEPTUNE 2 SYS 4 PORT 0702-020-000

## (undated) DEVICE — GLOVE BIOGEL PI SZ 7.5 40875

## (undated) DEVICE — ESU PENCIL SMOKE EVAC W/ROCKER SWITCH 0703-047-000

## (undated) RX ORDER — CEFAZOLIN SODIUM/WATER 2 G/20 ML
SYRINGE (ML) INTRAVENOUS
Status: DISPENSED
Start: 2023-11-14

## (undated) RX ORDER — EPHEDRINE SULFATE 50 MG/ML
INJECTION, SOLUTION INTRAMUSCULAR; INTRAVENOUS; SUBCUTANEOUS
Status: DISPENSED
Start: 2023-11-14

## (undated) RX ORDER — KETOROLAC TROMETHAMINE 15 MG/ML
INJECTION, SOLUTION INTRAMUSCULAR; INTRAVENOUS
Status: DISPENSED
Start: 2023-11-14

## (undated) RX ORDER — DEXAMETHASONE SODIUM PHOSPHATE 4 MG/ML
INJECTION, SOLUTION INTRA-ARTICULAR; INTRALESIONAL; INTRAMUSCULAR; INTRAVENOUS; SOFT TISSUE
Status: DISPENSED
Start: 2023-11-14

## (undated) RX ORDER — PROPOFOL 10 MG/ML
INJECTION, EMULSION INTRAVENOUS
Status: DISPENSED
Start: 2023-11-14

## (undated) RX ORDER — GLYCOPYRROLATE 0.2 MG/ML
INJECTION INTRAMUSCULAR; INTRAVENOUS
Status: DISPENSED
Start: 2023-11-14

## (undated) RX ORDER — FENTANYL CITRATE 50 UG/ML
INJECTION, SOLUTION INTRAMUSCULAR; INTRAVENOUS
Status: DISPENSED
Start: 2023-11-14

## (undated) RX ORDER — ONDANSETRON 2 MG/ML
INJECTION INTRAMUSCULAR; INTRAVENOUS
Status: DISPENSED
Start: 2023-11-14

## (undated) RX ORDER — HYDROMORPHONE HCL IN WATER/PF 6 MG/30 ML
PATIENT CONTROLLED ANALGESIA SYRINGE INTRAVENOUS
Status: DISPENSED
Start: 2023-11-14

## (undated) RX ORDER — TRANEXAMIC ACID 650 MG/1
TABLET ORAL
Status: DISPENSED
Start: 2023-11-14

## (undated) RX ORDER — LIDOCAINE HYDROCHLORIDE 10 MG/ML
INJECTION, SOLUTION EPIDURAL; INFILTRATION; INTRACAUDAL; PERINEURAL
Status: DISPENSED
Start: 2023-11-14

## (undated) RX ORDER — ACETAMINOPHEN 325 MG/1
TABLET ORAL
Status: DISPENSED
Start: 2023-11-14